# Patient Record
Sex: MALE | Race: WHITE | Employment: FULL TIME | URBAN - METROPOLITAN AREA
[De-identification: names, ages, dates, MRNs, and addresses within clinical notes are randomized per-mention and may not be internally consistent; named-entity substitution may affect disease eponyms.]

---

## 2020-11-09 ENCOUNTER — APPOINTMENT (OUTPATIENT)
Dept: GENERAL RADIOLOGY | Age: 45
End: 2020-11-09
Payer: COMMERCIAL

## 2020-11-09 ENCOUNTER — HOSPITAL ENCOUNTER (EMERGENCY)
Age: 45
Discharge: HOME | End: 2020-11-09
Attending: EMERGENCY MEDICINE
Payer: COMMERCIAL

## 2020-11-09 VITALS
DIASTOLIC BLOOD PRESSURE: 104 MMHG | HEART RATE: 77 BPM | SYSTOLIC BLOOD PRESSURE: 151 MMHG | WEIGHT: 200 LBS | TEMPERATURE: 97.5 F | RESPIRATION RATE: 16 BRPM | BODY MASS INDEX: 28 KG/M2 | OXYGEN SATURATION: 95 % | HEIGHT: 71 IN

## 2020-11-09 PROCEDURE — 99285 EMERGENCY DEPT VISIT HI MDM: CPT

## 2020-11-09 PROCEDURE — 27818 TREATMENT OF ANKLE FRACTURE: CPT

## 2020-11-09 PROCEDURE — 6370000000 HC RX 637 (ALT 250 FOR IP): Performed by: PHYSICIAN ASSISTANT

## 2020-11-09 PROCEDURE — 96374 THER/PROPH/DIAG INJ IV PUSH: CPT

## 2020-11-09 PROCEDURE — 73610 X-RAY EXAM OF ANKLE: CPT

## 2020-11-09 PROCEDURE — 94770 HC ETCO2 MONITOR DAILY: CPT

## 2020-11-09 PROCEDURE — 96375 TX/PRO/DX INJ NEW DRUG ADDON: CPT

## 2020-11-09 PROCEDURE — 94761 N-INVAS EAR/PLS OXIMETRY MLT: CPT

## 2020-11-09 PROCEDURE — 2500000003 HC RX 250 WO HCPCS: Performed by: PHYSICIAN ASSISTANT

## 2020-11-09 PROCEDURE — 73600 X-RAY EXAM OF ANKLE: CPT

## 2020-11-09 PROCEDURE — 6360000002 HC RX W HCPCS: Performed by: PHYSICIAN ASSISTANT

## 2020-11-09 PROCEDURE — 2700000000 HC OXYGEN THERAPY PER DAY

## 2020-11-09 PROCEDURE — 6360000002 HC RX W HCPCS: Performed by: EMERGENCY MEDICINE

## 2020-11-09 RX ORDER — HYDROCODONE BITARTRATE AND ACETAMINOPHEN 5; 325 MG/1; MG/1
1 TABLET ORAL EVERY 6 HOURS PRN
Qty: 12 TABLET | Refills: 0 | Status: SHIPPED | OUTPATIENT
Start: 2020-11-09 | End: 2020-11-12

## 2020-11-09 RX ORDER — HYDROCODONE BITARTRATE AND ACETAMINOPHEN 5; 325 MG/1; MG/1
1 TABLET ORAL ONCE
Status: COMPLETED | OUTPATIENT
Start: 2020-11-09 | End: 2020-11-09

## 2020-11-09 RX ORDER — KETAMINE HYDROCHLORIDE 100 MG/ML
1 INJECTION, SOLUTION INTRAMUSCULAR; INTRAVENOUS ONCE
Status: COMPLETED | OUTPATIENT
Start: 2020-11-09 | End: 2020-11-09

## 2020-11-09 RX ORDER — PROPOFOL 10 MG/ML
1 INJECTION, EMULSION INTRAVENOUS ONCE
Status: COMPLETED | OUTPATIENT
Start: 2020-11-09 | End: 2020-11-09

## 2020-11-09 RX ORDER — IBUPROFEN 400 MG/1
400 TABLET ORAL EVERY 6 HOURS PRN
Qty: 20 TABLET | Refills: 0 | Status: SHIPPED | OUTPATIENT
Start: 2020-11-09

## 2020-11-09 RX ADMIN — KETAMINE HYDROCHLORIDE 90 MG: 100 INJECTION, SOLUTION, CONCENTRATE INTRAMUSCULAR; INTRAVENOUS at 18:13

## 2020-11-09 RX ADMIN — HYDROCODONE BITARTRATE AND ACETAMINOPHEN 1 TABLET: 5; 325 TABLET ORAL at 20:06

## 2020-11-09 RX ADMIN — PROPOFOL 91 MG: 10 INJECTION, EMULSION INTRAVENOUS at 18:11

## 2020-11-09 RX ADMIN — HYDROMORPHONE HYDROCHLORIDE 1 MG: 1 INJECTION, SOLUTION INTRAMUSCULAR; INTRAVENOUS; SUBCUTANEOUS at 20:15

## 2020-11-09 RX ADMIN — HYDROMORPHONE HYDROCHLORIDE 1 MG: 1 INJECTION, SOLUTION INTRAMUSCULAR; INTRAVENOUS; SUBCUTANEOUS at 17:41

## 2020-11-09 ASSESSMENT — PAIN SCALES - GENERAL
PAINLEVEL_OUTOF10: 5
PAINLEVEL_OUTOF10: 4
PAINLEVEL_OUTOF10: 10
PAINLEVEL_OUTOF10: 10
PAINLEVEL_OUTOF10: 6
PAINLEVEL_OUTOF10: 5
PAINLEVEL_OUTOF10: 5

## 2020-11-09 ASSESSMENT — PAIN DESCRIPTION - ORIENTATION: ORIENTATION: RIGHT

## 2020-11-09 ASSESSMENT — PAIN DESCRIPTION - LOCATION: LOCATION: ANKLE

## 2020-11-09 NOTE — ED NOTES
Reduction complete. Tech applying splint to right leg.  Distal pulses present     Joelle Blankenship RN  11/09/20 1499

## 2020-11-10 ASSESSMENT — ENCOUNTER SYMPTOMS
EYE PAIN: 0
SORE THROAT: 0
ABDOMINAL PAIN: 0
SHORTNESS OF BREATH: 0
BACK PAIN: 0
VOMITING: 0
NAUSEA: 0
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
## 2020-11-10 NOTE — ED NOTES
Pt instructed on crutch use and demonstrated understanding.  Pt discharged in stable condition with family     Roe Rinaldi RN  11/09/20 8891

## 2020-11-10 NOTE — ED NOTES
Went into the room one to give pt crutches. Looked at split and noticed splint was not made right. Pull Rn, PA and MD  Side and discussed about splint. PA and MD agreed. Splint was cut off and remade. Padding was applied, along with 5 cm of short leg, with 8 cm of a sugar tong. 2 X 4INCH ace wrap. Pt tolerated well. PA and RN in room at time.       Angel Sanchez  11/09/20 2032

## 2020-11-12 ENCOUNTER — OFFICE VISIT (OUTPATIENT)
Dept: PRIMARY CARE CLINIC | Age: 45
End: 2020-11-12

## 2020-11-12 ENCOUNTER — ANESTHESIA EVENT (OUTPATIENT)
Dept: OPERATING ROOM | Age: 45
End: 2020-11-12
Payer: COMMERCIAL

## 2020-11-12 ENCOUNTER — OFFICE VISIT (OUTPATIENT)
Dept: ORTHOPEDIC SURGERY | Age: 45
End: 2020-11-12
Payer: COMMERCIAL

## 2020-11-12 ENCOUNTER — TELEPHONE (OUTPATIENT)
Dept: ORTHOPEDIC SURGERY | Age: 45
End: 2020-11-12

## 2020-11-12 VITALS — HEIGHT: 71 IN | WEIGHT: 200 LBS | BODY MASS INDEX: 28 KG/M2

## 2020-11-12 PROCEDURE — 99999 PR OFFICE/OUTPT VISIT,PROCEDURE ONLY: CPT | Performed by: NURSE PRACTITIONER

## 2020-11-12 PROCEDURE — 99242 OFF/OP CONSLTJ NEW/EST SF 20: CPT | Performed by: ORTHOPAEDIC SURGERY

## 2020-11-12 RX ORDER — PHENTERMINE HYDROCHLORIDE 37.5 MG/1
37.5 CAPSULE ORAL EVERY MORNING
COMMUNITY

## 2020-11-12 NOTE — PROGRESS NOTES
Obstructive Sleep Apnea (USHA) Screening     Patient:  Diane Turk    YOB: 1975      Medical Record #:  [id number]                     Date:  11/12/2020     1. Are you a loud and/or regular snorer? []  Yes       [x] No    2. Have you been observed to gasp or stop breathing during sleep? []  Yes       [x] No    3. Do you feel tired or groggy upon awakening or do you awaken with a headache?           []  Yes       [x] No    4. Are you often tired or fatigued during the wake time hours? []  Yes       [x] No    5. Do you fall asleep sitting, reading, watching TV or driving? []  Yes       [x] No    6. Do you often have problems with memory or concentration? []  Yes       [x] No    **If patient's score is ? 3 they are considered high risk for USHA. An Anesthesia provider will evaluate the patient and develop a plan of care the day of surgery. Note:  If the patient's BMI is more than 35 kg m¯² , has neck circumference > 40 cm, and/or high blood pressure the risk is greater (© American Sleep Apnea Association, 2006).

## 2020-11-12 NOTE — LETTER
[facility]   Surgery Precert & Billing Form:    DEMOGRAPHICS:                                                                                                       Patient Name:  Nino Loyola  Patient :  1975   Patient SS#:      Patient Phone:  [phone] (home)  Alt. Patient Phone:    Patient Address:  [address] [address]    PCP:  Lamin Shafer MD  Insurance: Payor: [facility] / Plan: [facility] - [place] PPO / Product Type: *No Product type* /     DIAGNOSIS & PROCEDURE:                                                                                      Diagnosis:   1.  Closed trimalleolar fracture of right ankle, initial encounter      Diagnosis Code:  N25.162Q      Operation: right    SURGERY  INFORMATION  Date of Surgery:   20   Location:  [facility]    Type:    Outpatient  23 hour hold:  No  Surgeon:              Celine Jiménez MD      20     BILLING INFORMATION:                                                                                                Physician Procedure                                            CPT Codes        ORIF Right trimalleolar fracture  87118                      PA, or Fellow Procedure                                      CPT Codes                     Precert information:

## 2020-11-12 NOTE — LETTER
[address] [place] [address]              [] [facility]  Fax# [phone]                                                     [x] Vignesh TVY#[phone]                                      []Jeanne Fernandes3 [facility]#[phone]    Scheduled Date: 20      Scheduled Time: 1pm      Time Needed: 60min     Patient Information:      Name: Clarence Marroquin      YOB: 1975      SSN:         Gender:  male                            Address: [address] [place] [address]   Phone Number: [phone] (home)     Insurance:  Payor: [employer] / Plan: [employer] - [place] PPO / Product Type: *No Product type* /     Primary Care Physician:  Rossi Holman MD    H&P To Be Completed By: Dr. Petty Gip Needed? [] Yes [x] No   Pacemaker/Cardiac Implant? [] Yes [x] No    Surgical Procedure: ORIF Right trimalleolar ankle fracture   CPT AAYK:27479    Pre- Op Diagnosis:   1. Closed trimalleolar fracture of right ankle, initial encounter      Diagnosis Code: A99.523S    Rep: MyScreen      Notified: [x] Yes [] No    Special Equipment: Synthes medial malleolar plate, Supine position      [x] Mini C-ARM   [] Large  C-ARM     Patient Status:   [x] Outpatient         [] Same Day Admission      [] In- Patient          []Day Admit    Anesthesia Type:    [x] General         [] MAC       [] IV Regional          [] Local          [x] Popliteal   Allergies:  No Known Allergies  Latex: [] Yes [x] No     Vitals:    20 1108   Weight: 200 lb (90.7 kg)   Height: 5' 11\" (1.803 m)                                                                  PRE-SURGICAL PHYSICIAN ORDERS    Patient Name Clarence Marroquin     1975       Allergies Patient has no known allergies.             Pre-surgery Testing Orders:   [] Pre-surgical Anesthesia Orders Per Anesthesiologist    Preop Antibiotic Prophylaxis / DAY OF SURGERY

## 2020-11-12 NOTE — PATIENT INSTRUCTIONS

## 2020-11-13 ENCOUNTER — ANESTHESIA (OUTPATIENT)
Dept: OPERATING ROOM | Age: 45
End: 2020-11-13
Payer: COMMERCIAL

## 2020-11-13 ENCOUNTER — HOSPITAL ENCOUNTER (OUTPATIENT)
Age: 45
Setting detail: OUTPATIENT SURGERY
Discharge: HOME | End: 2020-11-13
Attending: ORTHOPAEDIC SURGERY | Admitting: ORTHOPAEDIC SURGERY
Payer: COMMERCIAL

## 2020-11-13 VITALS
WEIGHT: 200 LBS | HEART RATE: 102 BPM | SYSTOLIC BLOOD PRESSURE: 136 MMHG | TEMPERATURE: 97.4 F | RESPIRATION RATE: 16 BRPM | BODY MASS INDEX: 28 KG/M2 | HEIGHT: 71 IN | DIASTOLIC BLOOD PRESSURE: 98 MMHG | OXYGEN SATURATION: 93 %

## 2020-11-13 VITALS
RESPIRATION RATE: 5 BRPM | SYSTOLIC BLOOD PRESSURE: 110 MMHG | TEMPERATURE: 98.2 F | OXYGEN SATURATION: 98 % | DIASTOLIC BLOOD PRESSURE: 58 MMHG

## 2020-11-13 LAB — SARS-COV-2, PCR: NOT DETECTED

## 2020-11-13 PROCEDURE — 6360000002 HC RX W HCPCS: Performed by: ANESTHESIOLOGY

## 2020-11-13 PROCEDURE — 7100000000 HC PACU RECOVERY - FIRST 15 MIN: Performed by: ORTHOPAEDIC SURGERY

## 2020-11-13 PROCEDURE — 7100000001 HC PACU RECOVERY - ADDTL 15 MIN: Performed by: ORTHOPAEDIC SURGERY

## 2020-11-13 PROCEDURE — 6370000000 HC RX 637 (ALT 250 FOR IP): Performed by: ORTHOPAEDIC SURGERY

## 2020-11-13 PROCEDURE — C1713 ANCHOR/SCREW BN/BN,TIS/BN: HCPCS | Performed by: ORTHOPAEDIC SURGERY

## 2020-11-13 PROCEDURE — 6360000002 HC RX W HCPCS: Performed by: ORTHOPAEDIC SURGERY

## 2020-11-13 PROCEDURE — 2500000003 HC RX 250 WO HCPCS: Performed by: ANESTHESIOLOGY

## 2020-11-13 PROCEDURE — 2500000003 HC RX 250 WO HCPCS: Performed by: NURSE ANESTHETIST, CERTIFIED REGISTERED

## 2020-11-13 PROCEDURE — C1769 GUIDE WIRE: HCPCS | Performed by: ORTHOPAEDIC SURGERY

## 2020-11-13 PROCEDURE — 3700000001 HC ADD 15 MINUTES (ANESTHESIA): Performed by: ORTHOPAEDIC SURGERY

## 2020-11-13 PROCEDURE — 7100000011 HC PHASE II RECOVERY - ADDTL 15 MIN: Performed by: ORTHOPAEDIC SURGERY

## 2020-11-13 PROCEDURE — 6360000002 HC RX W HCPCS: Performed by: NURSE ANESTHETIST, CERTIFIED REGISTERED

## 2020-11-13 PROCEDURE — 2709999900 HC NON-CHARGEABLE SUPPLY: Performed by: ORTHOPAEDIC SURGERY

## 2020-11-13 PROCEDURE — 2580000003 HC RX 258: Performed by: ANESTHESIOLOGY

## 2020-11-13 PROCEDURE — 64447 NJX AA&/STRD FEMORAL NRV IMG: CPT | Performed by: ANESTHESIOLOGY

## 2020-11-13 PROCEDURE — 3600000015 HC SURGERY LEVEL 5 ADDTL 15MIN: Performed by: ORTHOPAEDIC SURGERY

## 2020-11-13 PROCEDURE — 7100000010 HC PHASE II RECOVERY - FIRST 15 MIN: Performed by: ORTHOPAEDIC SURGERY

## 2020-11-13 PROCEDURE — 3700000000 HC ANESTHESIA ATTENDED CARE: Performed by: ORTHOPAEDIC SURGERY

## 2020-11-13 PROCEDURE — 3600000005 HC SURGERY LEVEL 5 BASE: Performed by: ORTHOPAEDIC SURGERY

## 2020-11-13 PROCEDURE — 64445 NJX AA&/STRD SCIATIC NRV IMG: CPT | Performed by: ANESTHESIOLOGY

## 2020-11-13 PROCEDURE — 2580000003 HC RX 258: Performed by: ORTHOPAEDIC SURGERY

## 2020-11-13 PROCEDURE — 2720000010 HC SURG SUPPLY STERILE: Performed by: ORTHOPAEDIC SURGERY

## 2020-11-13 DEVICE — PLATE BNE L99MM 5 H R DST LAT FIBULAR S STL LOK COMPR FOR: Type: IMPLANTABLE DEVICE | Site: ANKLE | Status: FUNCTIONAL

## 2020-11-13 DEVICE — SCREW BNE L36MM DIA4MM S STL CANN SHT 1/3 THRD SM HEX SOCK: Type: IMPLANTABLE DEVICE | Site: ANKLE | Status: FUNCTIONAL

## 2020-11-13 DEVICE — SCREW BNE L38MM DIA4MM S STL CANN SHT 1/3 THRD SM HEX SOCK: Type: IMPLANTABLE DEVICE | Site: ANKLE | Status: FUNCTIONAL

## 2020-11-13 DEVICE — SCREW BNE L12MM DIA2.7MM ANK S STL ST VAR ANG LOK FULL THRD: Type: IMPLANTABLE DEVICE | Site: ANKLE | Status: FUNCTIONAL

## 2020-11-13 DEVICE — SCREW BNE L14MM DIA2.7MM ANK S STL ST VAR ANG LOK FULL THRD: Type: IMPLANTABLE DEVICE | Site: ANKLE | Status: FUNCTIONAL

## 2020-11-13 DEVICE — SCREW BNE L14MM DIA3.5MM CORT S STL ST NONCANNULATED LOK: Type: IMPLANTABLE DEVICE | Site: ANKLE | Status: FUNCTIONAL

## 2020-11-13 DEVICE — SCREW BNE L16MM DIA2.7MM ANK S STL ST VAR ANG LOK FULL THRD: Type: IMPLANTABLE DEVICE | Site: ANKLE | Status: FUNCTIONAL

## 2020-11-13 DEVICE — SCREW BNE L20MM DIA2.7MM CORT S STL ST FULL THRD FOR SM: Type: IMPLANTABLE DEVICE | Site: ANKLE | Status: FUNCTIONAL

## 2020-11-13 RX ORDER — CELECOXIB 100 MG/1
400 CAPSULE ORAL
Status: COMPLETED | OUTPATIENT
Start: 2020-11-13 | End: 2020-11-13

## 2020-11-13 RX ORDER — LIDOCAINE HYDROCHLORIDE 20 MG/ML
INJECTION, SOLUTION INFILTRATION; PERINEURAL PRN
Status: DISCONTINUED | OUTPATIENT
Start: 2020-11-13 | End: 2020-11-13 | Stop reason: SDUPTHER

## 2020-11-13 RX ORDER — OXYCODONE HYDROCHLORIDE AND ACETAMINOPHEN 5; 325 MG/1; MG/1
1 TABLET ORAL EVERY 6 HOURS PRN
Qty: 28 TABLET | Refills: 0 | Status: SHIPPED | OUTPATIENT
Start: 2020-11-13 | End: 2020-11-19 | Stop reason: SDUPTHER

## 2020-11-13 RX ORDER — SODIUM CHLORIDE, SODIUM LACTATE, POTASSIUM CHLORIDE, CALCIUM CHLORIDE 600; 310; 30; 20 MG/100ML; MG/100ML; MG/100ML; MG/100ML
INJECTION, SOLUTION INTRAVENOUS CONTINUOUS
Status: DISCONTINUED | OUTPATIENT
Start: 2020-11-13 | End: 2020-11-13 | Stop reason: HOSPADM

## 2020-11-13 RX ORDER — SODIUM CHLORIDE 0.9 % (FLUSH) 0.9 %
10 SYRINGE (ML) INJECTION PRN
Status: DISCONTINUED | OUTPATIENT
Start: 2020-11-13 | End: 2020-11-13 | Stop reason: HOSPADM

## 2020-11-13 RX ORDER — MORPHINE SULFATE 2 MG/ML
1 INJECTION, SOLUTION INTRAMUSCULAR; INTRAVENOUS EVERY 5 MIN PRN
Status: DISCONTINUED | OUTPATIENT
Start: 2020-11-13 | End: 2020-11-13 | Stop reason: HOSPADM

## 2020-11-13 RX ORDER — MIDAZOLAM HYDROCHLORIDE 1 MG/ML
INJECTION INTRAMUSCULAR; INTRAVENOUS PRN
Status: DISCONTINUED | OUTPATIENT
Start: 2020-11-13 | End: 2020-11-13 | Stop reason: SDUPTHER

## 2020-11-13 RX ORDER — HYDROCODONE BITARTRATE AND ACETAMINOPHEN 5; 325 MG/1; MG/1
1 TABLET ORAL EVERY 6 HOURS PRN
Status: ON HOLD | COMMUNITY
End: 2020-11-13 | Stop reason: HOSPADM

## 2020-11-13 RX ORDER — MORPHINE SULFATE 2 MG/ML
2 INJECTION, SOLUTION INTRAMUSCULAR; INTRAVENOUS EVERY 5 MIN PRN
Status: DISCONTINUED | OUTPATIENT
Start: 2020-11-13 | End: 2020-11-13 | Stop reason: HOSPADM

## 2020-11-13 RX ORDER — ACETAMINOPHEN 500 MG
1000 TABLET ORAL
Status: COMPLETED | OUTPATIENT
Start: 2020-11-13 | End: 2020-11-13

## 2020-11-13 RX ORDER — OXYCODONE HYDROCHLORIDE AND ACETAMINOPHEN 5; 325 MG/1; MG/1
2 TABLET ORAL PRN
Status: DISCONTINUED | OUTPATIENT
Start: 2020-11-13 | End: 2020-11-13 | Stop reason: HOSPADM

## 2020-11-13 RX ORDER — LABETALOL HYDROCHLORIDE 5 MG/ML
5 INJECTION, SOLUTION INTRAVENOUS EVERY 10 MIN PRN
Status: DISCONTINUED | OUTPATIENT
Start: 2020-11-13 | End: 2020-11-13 | Stop reason: HOSPADM

## 2020-11-13 RX ORDER — OXYCODONE HYDROCHLORIDE AND ACETAMINOPHEN 5; 325 MG/1; MG/1
1 TABLET ORAL PRN
Status: DISCONTINUED | OUTPATIENT
Start: 2020-11-13 | End: 2020-11-13 | Stop reason: HOSPADM

## 2020-11-13 RX ORDER — BUPIVACAINE HYDROCHLORIDE 2.5 MG/ML
INJECTION, SOLUTION EPIDURAL; INFILTRATION; INTRACAUDAL
Status: COMPLETED | OUTPATIENT
Start: 2020-11-13 | End: 2020-11-13

## 2020-11-13 RX ORDER — ONDANSETRON 2 MG/ML
INJECTION INTRAMUSCULAR; INTRAVENOUS PRN
Status: DISCONTINUED | OUTPATIENT
Start: 2020-11-13 | End: 2020-11-13 | Stop reason: SDUPTHER

## 2020-11-13 RX ORDER — MAGNESIUM HYDROXIDE 1200 MG/15ML
LIQUID ORAL CONTINUOUS PRN
Status: COMPLETED | OUTPATIENT
Start: 2020-11-13 | End: 2020-11-13

## 2020-11-13 RX ORDER — SODIUM CHLORIDE 0.9 % (FLUSH) 0.9 %
10 SYRINGE (ML) INJECTION EVERY 12 HOURS SCHEDULED
Status: DISCONTINUED | OUTPATIENT
Start: 2020-11-13 | End: 2020-11-13 | Stop reason: HOSPADM

## 2020-11-13 RX ORDER — PROMETHAZINE HYDROCHLORIDE 25 MG/ML
6.25 INJECTION, SOLUTION INTRAMUSCULAR; INTRAVENOUS
Status: DISCONTINUED | OUTPATIENT
Start: 2020-11-13 | End: 2020-11-13 | Stop reason: HOSPADM

## 2020-11-13 RX ORDER — DIPHENHYDRAMINE HYDROCHLORIDE 50 MG/ML
12.5 INJECTION INTRAMUSCULAR; INTRAVENOUS
Status: DISCONTINUED | OUTPATIENT
Start: 2020-11-13 | End: 2020-11-13 | Stop reason: HOSPADM

## 2020-11-13 RX ORDER — HYDRALAZINE HYDROCHLORIDE 20 MG/ML
5 INJECTION INTRAMUSCULAR; INTRAVENOUS EVERY 10 MIN PRN
Status: DISCONTINUED | OUTPATIENT
Start: 2020-11-13 | End: 2020-11-13 | Stop reason: HOSPADM

## 2020-11-13 RX ORDER — PROPOFOL 10 MG/ML
INJECTION, EMULSION INTRAVENOUS PRN
Status: DISCONTINUED | OUTPATIENT
Start: 2020-11-13 | End: 2020-11-13 | Stop reason: SDUPTHER

## 2020-11-13 RX ORDER — MEPERIDINE HYDROCHLORIDE 50 MG/ML
12.5 INJECTION INTRAMUSCULAR; INTRAVENOUS; SUBCUTANEOUS EVERY 5 MIN PRN
Status: DISCONTINUED | OUTPATIENT
Start: 2020-11-13 | End: 2020-11-13 | Stop reason: HOSPADM

## 2020-11-13 RX ORDER — DEXAMETHASONE SODIUM PHOSPHATE 10 MG/ML
INJECTION INTRAMUSCULAR; INTRAVENOUS PRN
Status: DISCONTINUED | OUTPATIENT
Start: 2020-11-13 | End: 2020-11-13 | Stop reason: SDUPTHER

## 2020-11-13 RX ORDER — ONDANSETRON 2 MG/ML
4 INJECTION INTRAMUSCULAR; INTRAVENOUS PRN
Status: DISCONTINUED | OUTPATIENT
Start: 2020-11-13 | End: 2020-11-13 | Stop reason: HOSPADM

## 2020-11-13 RX ADMIN — ACETAMINOPHEN 1000 MG: 500 TABLET ORAL at 12:11

## 2020-11-13 RX ADMIN — SODIUM CHLORIDE, SODIUM LACTATE, POTASSIUM CHLORIDE, AND CALCIUM CHLORIDE: .6; .31; .03; .02 INJECTION, SOLUTION INTRAVENOUS at 13:17

## 2020-11-13 RX ADMIN — FAMOTIDINE 20 MG: 10 INJECTION, SOLUTION INTRAVENOUS at 12:11

## 2020-11-13 RX ADMIN — CEFAZOLIN SODIUM 2 G: 10 INJECTION, POWDER, FOR SOLUTION INTRAVENOUS at 13:17

## 2020-11-13 RX ADMIN — BUPIVACAINE HYDROCHLORIDE 20 ML: 2.5 INJECTION, SOLUTION EPIDURAL; INFILTRATION; INTRACAUDAL; PERINEURAL at 13:06

## 2020-11-13 RX ADMIN — CELECOXIB 400 MG: 100 CAPSULE ORAL at 12:11

## 2020-11-13 RX ADMIN — SODIUM CHLORIDE, SODIUM LACTATE, POTASSIUM CHLORIDE, AND CALCIUM CHLORIDE: .6; .31; .03; .02 INJECTION, SOLUTION INTRAVENOUS at 14:50

## 2020-11-13 RX ADMIN — DEXAMETHASONE SODIUM PHOSPHATE 10 MG: 10 INJECTION INTRAMUSCULAR; INTRAVENOUS at 13:28

## 2020-11-13 RX ADMIN — LIDOCAINE HYDROCHLORIDE 80 MG: 20 INJECTION, SOLUTION INFILTRATION; PERINEURAL at 13:20

## 2020-11-13 RX ADMIN — MIDAZOLAM HYDROCHLORIDE 4 MG: 2 INJECTION, SOLUTION INTRAMUSCULAR; INTRAVENOUS at 12:48

## 2020-11-13 RX ADMIN — PROPOFOL 200 MG: 10 INJECTION, EMULSION INTRAVENOUS at 13:20

## 2020-11-13 RX ADMIN — ONDANSETRON 4 MG: 2 INJECTION INTRAMUSCULAR; INTRAVENOUS at 13:28

## 2020-11-13 RX ADMIN — ONDANSETRON 4 MG: 2 INJECTION INTRAMUSCULAR; INTRAVENOUS at 15:43

## 2020-11-13 ASSESSMENT — PULMONARY FUNCTION TESTS
PIF_VALUE: 2
PIF_VALUE: 11
PIF_VALUE: 2
PIF_VALUE: 5
PIF_VALUE: 2
PIF_VALUE: 11
PIF_VALUE: 2
PIF_VALUE: 11
PIF_VALUE: 2
PIF_VALUE: 11
PIF_VALUE: 5
PIF_VALUE: 11
PIF_VALUE: 11
PIF_VALUE: 3
PIF_VALUE: 11
PIF_VALUE: 2
PIF_VALUE: 3
PIF_VALUE: 2
PIF_VALUE: 11
PIF_VALUE: 1
PIF_VALUE: 2
PIF_VALUE: 2
PIF_VALUE: 11
PIF_VALUE: 2
PIF_VALUE: 2
PIF_VALUE: 3
PIF_VALUE: 2
PIF_VALUE: 2
PIF_VALUE: 12
PIF_VALUE: 10
PIF_VALUE: 10
PIF_VALUE: 11
PIF_VALUE: 2
PIF_VALUE: 11
PIF_VALUE: 11
PIF_VALUE: 2
PIF_VALUE: 11
PIF_VALUE: 3
PIF_VALUE: 12
PIF_VALUE: 1
PIF_VALUE: 11
PIF_VALUE: 2
PIF_VALUE: 2
PIF_VALUE: 11
PIF_VALUE: 10
PIF_VALUE: 11
PIF_VALUE: 11
PIF_VALUE: 1
PIF_VALUE: 11
PIF_VALUE: 2
PIF_VALUE: 11
PIF_VALUE: 2
PIF_VALUE: 11
PIF_VALUE: 2
PIF_VALUE: 11
PIF_VALUE: 2
PIF_VALUE: 11
PIF_VALUE: 2
PIF_VALUE: 2
PIF_VALUE: 11
PIF_VALUE: 5
PIF_VALUE: 2
PIF_VALUE: 2
PIF_VALUE: 11
PIF_VALUE: 5
PIF_VALUE: 1
PIF_VALUE: 2
PIF_VALUE: 3
PIF_VALUE: 1
PIF_VALUE: 5
PIF_VALUE: 2
PIF_VALUE: 4
PIF_VALUE: 2
PIF_VALUE: 11
PIF_VALUE: 10
PIF_VALUE: 11
PIF_VALUE: 3
PIF_VALUE: 11
PIF_VALUE: 11
PIF_VALUE: 2
PIF_VALUE: 3

## 2020-11-13 ASSESSMENT — PAIN SCALES - GENERAL: PAINLEVEL_OUTOF10: 2

## 2020-11-13 ASSESSMENT — PAIN - FUNCTIONAL ASSESSMENT: PAIN_FUNCTIONAL_ASSESSMENT: 0-10

## 2020-11-13 NOTE — OP NOTE
Operative Note      Patient: Danie Rogers  YOB: 1975  MRN: [id number]    Date of Procedure: 11/13/2020    Pre-Op Diagnosis: CLOSED TRIMALLEOLAR FRACTURE RIGHT ANKLE    Post-Op Diagnosis: Same       Procedure(s):  OPEN REDUCTION INTERNAL FIXATION RIGHT TRIMALLEOLAR ANKLE FRACTURE WITH MINI C-ARM              SYNTHES    #1. Open reduction internal fixation right trimalleolar ankle fracture with fixation of the lateral malleolus and medial malleolus with closed treatment of the posterior malleolar lip    #2. Obtain and interpret intraoperative fluoroscopic images including 3 views of the right ankle; mortise, lateral, intraoperative stress views. Surgeon(s):  Kellen Palacios MD    Assistant:   Surgical Assistant: Anai Andrade    Anesthesia: General    Estimated Blood Loss (mL): less than 765     Complications: None    Specimens:   * No specimens in log *    Implants:  Implant Name Type Inv.  Item Serial No.  Lot No. LRB No. Used Action   PLATE BNE [id number] 5 H R DST LAT FIBULAR S STL PK COMPR FOR  PLATE BNE [id number] 5 H R DST LAT FIBULAR S STL PK COMPR FOR  DEPUY [employer]  Right 1 Implanted   SCREW BNE L20MM DIA2.7MM JOHANNE S STL ST FULL THRD FOR SM  SCREW BNE L20MM DIA2.7MM JOHANNE S STL ST FULL THRD FOR SM  DEPUY [employer]WD  Right 1 Implanted   SCREW BNE L12MM DIA2.7MM ANK S STL ST CELIO ANG PK FULL THRD  SCREW BNE L12MM DIA2.7MM ANK S STL ST CELIO ANG PK FULL THRD  DEPUY [employer]-WD  Right 1 Implanted   SCREW BNE L16MM DIA2.7MM ANK S STL ST CELIO ANG PK FULL THRD  SCREW BNE L16MM DIA2.7MM ANK S STL ST CELIO ANG PK FULL THRD  DEPUY [employer]-WD  Right 1 Implanted   SCREW BNE L14MM DIA3.5MM JOHANNE S STL ST NONCANNULATED PK  SCREW BNE L14MM DIA3.5MM JOHANNE S STL ST NONCANNULATED PK  DEPUY [employer]-WD  Right 3 Implanted   SCREW BNE L14MM DIA2.7MM ANK S STL ST CELIO ANG PK FULL THRD  SCREW BNE L14MM DIA2.7MM ANK S STL ST CELIO ANG PK FULL THRD  DEPUY [employer]-WD  Right 2 Implanted SCREW BNE L36MM DIA4MM S STL TARI SHT 1/3 THRD SM HEX SOCK  SCREW BNE L36MM DIA4MM S STL TARI SHT 1/3 THRD SM HEX SOCK  DEPUY [employer]-WD  Right 1 Implanted   SCREW BNE L38MM DIA4MM S STL TARI SHT 1/3 THRD SM HEX SOCK  SCREW BNE L38MM DIA4MM S STL TARI SHT 1/3 THRD SM HEX SOCK  DEPUY [employer]-WD  Right 1 Implanted         Drains: * No LDAs found *    Findings: Please see operative note    Detailed Description of Procedure: This patient is a 58-year-old gentleman with a high-energy trimalleolar ankle fracture. Seen in the emergency room underwent a closed reduction of a complete posterior lateral dislocation. He had been seen in the office with a careful discussion of the risks benefits and alternatives to surgery. Surgery indicated for the restoration of stability of the mortise reliable healing. Informed consent was discussed with the patient. This included a detailed description of the procedure. Risks, benefits and alternatives specific to this diagnosis and procedure were outlined. Standard surgical risks of anesthesia, bleeding, nerve damage, infection, need for further surgeries, disability and death also outlined. Verbal confirmation of informed consent was obtained. Signature obtained by the staff. The patient was identified marked. Informed consent was confirmed. He is comfortable suite. He is placed supine. After the induction of anesthesia he was carefully padded and positioned. Tourniquet was applied to the distal  thigh leg was slightly elevated. Meticulous sterile prep and drape. The leg was exsanguinated with an Esmarch bandage after performing a surgical timeout. A direct lateral approach was utilized to the fibula. Skin incised sharply. Meticulous hemostasis. Superficial peroneal nerve protected. Careful elevation including the periosteum. Fracture line was easily identified with a typical posterior oblique Garcia B pattern.   Gentle distraction allowed a near anatomic reduction. It was clamped with a fracture reduction clamp. An AO lag screw technique was placed with good purchase. We subsequently placed a neutralizing buttress fibular locking plate. 4 locking screws distally. 3 cortical screws proximally. At this point we utilized intraoperative fluoroscopic imaging to confirm a good reduction of the fibula and placement of the hardware. In addition, we performed an intraoperative stress test to confirm the stability of the syndesmosis. The medial malleolar fragment was extremely displaced. We tried to utilize a percutaneous technique but were unable to satisfactorily reduce the joint. As such, we made an open incision over the medial malleolus. I utilized a drill hole proximally and a reduction clamp distally to perform an anatomic and well compressed reduction. This was confirmed on fluoroscopy. Subsequently to 4.0 mm cannulated screws were utilized. The fracture was quite distal and as such we needed to be quite distal and vertical for good fixation. We are very pleased with the firm tightening of the screws. Final imaging was obtained. This included mortise views and lateral views. We completed the procedure with irrigation with a dilute iodine solution. Careful closure in layers including the deep fascia with 2-0 Vicryl, buried 3-0 Vicryl and subtenons layer and staples in the skin. We utilized Xeroform, 4 x 4's and a very soft well-padded splint was applied in a sterile fashion. Anesthesia reversed and stable recovery room.     Patient be discharged home            Electronically signed by Ilene Arteaga MD on 11/13/2020 at 5:11 PM

## 2020-11-13 NOTE — PROGRESS NOTES
Discharge instructions reviewed with patient and wife. Patient and wife verbalized understanding. All home medications have been reviewed, questions answered and patient voiced understanding. Given prescriptions, discharge instructions, and appointment times.

## 2020-11-13 NOTE — ANESTHESIA PROCEDURE NOTES
Peripheral Block    Patient location during procedure: PACU  Start time: 11/13/2020 12:56 PM  End time: 11/13/2020 1:01 PM  Staffing  Anesthesiologist: Davida Taylor MD  Preanesthetic Checklist  Completed: patient identified, site marked, surgical consent, pre-op evaluation, timeout performed, IV checked, risks and benefits discussed, monitors and equipment checked, anesthesia consent given, oxygen available and patient being monitored  Peripheral Block  Patient position: supine  Prep: ChloraPrep  Patient monitoring: cardiac monitor, continuous pulse ox, frequent blood pressure checks and IV access  Block type: Femoral  Laterality: right  Injection technique: single-shot  Procedures: ultrasound guided  Local infiltration: lidocaine  Infiltration strength: 1 %  Adductor canal  Provider prep: mask and sterile gloves  Local infiltration: lidocaine  Needle  Needle type: combined needle/nerve stimulator   Needle gauge: 22 G  Needle length: 5 cm  Needle localization: ultrasound guidance  Assessment  Injection assessment: negative aspiration for heme, no paresthesia on injection and local visualized surrounding nerve on ultrasound  Slow fractionated injection: yes  Hemodynamics: stable  Additional Notes  Sartorius and Vastus Medialis Muscle, Femoral artery and Saphenous nerve are identified; the tip of the needle and the spread of the local anesthetic around the Saphenous nerve are visualized. The Saphenous nerve appeared to be anatomically normal and there were no abnormal pathologically findings seen.  Epi in block  Medications Administered  Bupivacaine (MARCAINE) PF injection 0.25%, 20 mL  Reason for block: post-op pain management

## 2020-11-13 NOTE — PROGRESS NOTES
Received report from [facility], SHAY and Ayaz Reeves RN. VSS. Sp02 93% on r/a. Oral airway in place. Pt sleeping soundly. Ace wrap to rt ankle with dressing c,d, & I. Will continue to monitor.

## 2020-11-13 NOTE — ANESTHESIA PROCEDURE NOTES
Peripheral Block    Patient location during procedure: pre-op  Start time: 11/13/2020 12:51 PM  End time: 11/13/2020 12:56 PM  Staffing  Anesthesiologist: Brittni Rios MD  Performed: anesthesiologist   Preanesthetic Checklist  Completed: patient identified, site marked, surgical consent, pre-op evaluation, timeout performed, IV checked, risks and benefits discussed, monitors and equipment checked, anesthesia consent given, oxygen available and patient being monitored  Peripheral Block  Prep: ChloraPrep  Patient monitoring: cardiac monitor, continuous pulse ox, frequent blood pressure checks and IV access  Block type: Sciatic  Laterality: right  Injection technique: single-shot  Procedures: ultrasound guided  Local infiltration: lidocaine  Infiltration strength: 1 %  Dose: 3 mL  Popliteal  Provider prep: mask and sterile gloves  Local infiltration: lidocaine  Needle  Needle gauge: 21 G  Needle length: 10 cm  Needle localization: ultrasound guidance  Assessment  Injection assessment: negative aspiration for heme, no paresthesia on injection and local visualized surrounding nerve on ultrasound  Paresthesia pain: none  Slow fractionated injection: yes  Hemodynamics: stable  Additional Notes  Immediately prior to procedure a \"time out\" was called to verify the correct patient, allergies, laterality, procedure and equipment. Time out performed with RN; epi in block    Biceps Femoris muscle (long head), Vastus lateralis muscle, Sciatic nerve (Tibia and Common Peroneal Nerves) and Popliteal artery are identified; the tip of the needle and the spread of the local anesthetic around the Tibial and Common Peroneal Nerve are visualized. The Sciatic Nerve (Tibia and Common Peroneal Nerve) appeared to be anatomically normal and there were no abnormal pathologically findings seen.          Medications Administered  Bupivacaine (MARCAINE) PF injection 0.25%, 20 mL  Reason for block: post-op pain management and at surgeon's request

## 2020-11-13 NOTE — H&P
Department of Orthopedic Surgery  Attending   History and Physical        CHIEF COMPLAINT:  [facility]    Reason for Admission: As Above    History Obtained From:  patient    HISTORY OF PRESENT ILLNESS:      The patient is a 54 y.o. male  who presents with plans for elective ORIF        Past Medical History:        Diagnosis Date    Kidney stone     Kidney stones     Melanoma ([address] 2003    FOREHEAD     Past Surgical History:        Procedure Laterality Date    LITHOTRIPSY      SKIN BIOPSY           Medications Prior to Admission:   Prior to Admission medications    Medication Sig Start Date End Date Taking? Authorizing Provider   HYDROcodone-acetaminophen (NORCO) 5-325 MG per tablet Take 1 tablet by mouth every 6 hours as needed for Pain. Yes Historical Provider, MD   phentermine (ADIPEX-P) 37.5 MG capsule Take 37.5 mg by mouth every morning. Yes Historical Provider, MD   ibuprofen (ADVIL;MOTRIN) 400 MG tablet Take 1 tablet by mouth every 6 hours as needed for Pain 11/9/20  Yes ANA Gaemz   ondansetron (ZOFRAN ODT) 4 MG disintegrating tablet Take 1 tablet by mouth every 8 hours as needed for Nausea 7/16/16   ANA Smith       Allergies:  Patient has no known allergies. Social History:    Tobacco:  reports that he has never smoked. He has never used smokeless tobacco.   Alcohol:  reports current alcohol use.    Illicit Drug: No  Family History:       Problem Relation Age of Onset    Other Father 43        ACCIDENTAL DEATH     REVIEW OF SYSTEMS:  CONSTITUTIONAL:  negative  MUSCULOSKELETAL:  positive for  pain    PHYSICAL EXAM:  Admission weight: 200 lb (90.7 kg)  5' 10.5\" (179.1 cm)  VITALS:  BP (!) 147/98   Pulse 90   Temp 98.1 °F (36.7 °C) (Temporal)   Resp 18   Ht 5' 10.5\" (1.791 m)   Wt 200 lb (90.7 kg)   SpO2 98%   BMI 28.29 kg/m²     CONSTITUTIONAL:  awake, alert, cooperative, no apparent distress, and appears stated age  Cardiac RRR  Pulm CTA B  MUSCULOSKELETAL:  There is no redness, warmth, or swelling of the joints. Full range of motion noted. Motor strength is 5 out of 5 all extremities bilaterally.   Tone is normal.    DATA:  CBC:   Lab Results   Component Value Date    WBC 5.7 07/15/2016    RBC 5.02 07/15/2016    HGB 14.4 07/15/2016    HCT 42.9 07/15/2016    MCV 85.5 07/15/2016    MCH 28.7 07/15/2016    MCHC 33.5 07/15/2016    RDW 14.3 07/15/2016     07/15/2016    MPV 10.3 07/15/2016     BMP:    Lab Results   Component Value Date     07/15/2016    K 4.2 07/15/2016    CL 99 07/15/2016    CO2 26 07/15/2016    BUN 21 07/15/2016    LABALBU 4.6 07/15/2016    CREATININE 1.0 07/15/2016    CALCIUM 9.7 07/15/2016    GFRAA >60 07/15/2016    GFRAA >60 05/10/2010    LABGLOM >60 07/15/2016    GLUCOSE 118 07/15/2016     PT/INR:  No results found for: PROTIME, INR    Radiology:  No orders to display         ASSESSMENT: [facility]    PLAN:  1)  ORIF  2)  D/C  3)  Std periop care      Mari Varghese

## 2020-11-13 NOTE — ANESTHESIA PRE PROCEDURE
Department of Anesthesiology  Preprocedure Note       Name:  Lara Sosa   Age:  54 y.o.  :  1975                                          MRN:  [id number]         Date:  2020      Surgeon: Lise Powers):  Letha Hdez MD    Procedure: Procedure(s):  OPEN REDUCTION INTERNAL FIXATION RIGHT TRIMALLEOLAR ANKLE FRACTURE WITH MINI C-ARM              SYNTHES    Medications prior to admission:   Prior to Admission medications    Medication Sig Start Date End Date Taking? Authorizing Provider   HYDROcodone-acetaminophen (NORCO) 5-325 MG per tablet Take 1 tablet by mouth every 6 hours as needed for Pain. Yes Historical Provider, MD   phentermine (ADIPEX-P) 37.5 MG capsule Take 37.5 mg by mouth every morning.    Yes Historical Provider, MD   ibuprofen (ADVIL;MOTRIN) 400 MG tablet Take 1 tablet by mouth every 6 hours as needed for Pain 20  Yes ANA Patel   ondansetron (ZOFRAN ODT) 4 MG disintegrating tablet Take 1 tablet by mouth every 8 hours as needed for Nausea 16   ANA Diaz       Current medications:    Current Facility-Administered Medications   Medication Dose Route Frequency Provider Last Rate Last Dose    ceFAZolin (ANCEF) 2 g in dextrose 5 % 100 mL IVPB  2 g Intravenous On Call to Frank Ortega MD        celecoxib (CELEBREX) capsule 400 mg  400 mg Oral On Call to Frank Ortega MD        acetaminophen (TYLENOL) tablet 1,000 mg  1,000 mg Oral On Call to Frank Ortega MD        lactated ringers infusion   Intravenous Continuous Shanice Mcgrath MD        sodium chloride flush 0.9 % injection 10 mL  10 mL Intravenous 2 times per day Shanice Mcgrath MD        sodium chloride flush 0.9 % injection 10 mL  10 mL Intravenous PRN Shanice Mcgrath MD        famotidine (PEPCID) injection 20 mg  20 mg Intravenous Once Shanice Mcgrath MD           Allergies:  No Known Allergies    Problem List:  There is no problem list on file for this patient. Past Medical History:        Diagnosis Date    Kidney stone     Kidney stones     Melanoma ([address] 2003    FOREHEAD       Past Surgical History:        Procedure Laterality Date    LITHOTRIPSY      SKIN BIOPSY         Social History:    Social History     Tobacco Use    Smoking status: Never Smoker    Smokeless tobacco: Never Used   Substance Use Topics    Alcohol use: Yes     Comment: occassionally                                Counseling given: Not Answered      Vital Signs (Current):   Vitals:    11/12/20 1258 11/13/20 1131   BP:  (!) 147/98   Pulse:  81   Resp:  18   Temp:  98.1 °F (36.7 °C)   TempSrc:  Temporal   SpO2:  98%   Weight: 200 lb (90.7 kg) 200 lb (90.7 kg)   Height: 5' 10.5\" (1.791 m) 5' 10.5\" (1.791 m)                                              BP Readings from Last 3 Encounters:   11/13/20 (!) 147/98   11/09/20 (!) 151/104   07/16/16 126/81       NPO Status: Time of last liquid consumption: 2200                        Time of last solid consumption: 2200                        Date of last liquid consumption: 11/12/20                        Date of last solid food consumption: 11/12/20    BMI:   Wt Readings from Last 3 Encounters:   11/13/20 200 lb (90.7 kg)   11/12/20 200 lb (90.7 kg)   11/09/20 200 lb (90.7 kg)     Body mass index is 28.29 kg/m².     CBC:   Lab Results   Component Value Date    WBC 5.7 07/15/2016    RBC 5.02 07/15/2016    HGB 14.4 07/15/2016    HCT 42.9 07/15/2016    MCV 85.5 07/15/2016    RDW 14.3 07/15/2016     07/15/2016       CMP:   Lab Results   Component Value Date     07/15/2016    K 4.2 07/15/2016    CL 99 07/15/2016    CO2 26 07/15/2016    BUN 21 07/15/2016    CREATININE 1.0 07/15/2016    GFRAA >60 07/15/2016    GFRAA >60 05/10/2010    AGRATIO 1.6 07/15/2016    LABGLOM >60 07/15/2016    GLUCOSE 118 07/15/2016    PROT 7.5 07/15/2016    PROT 6.8 05/10/2010    CALCIUM 9.7 07/15/2016    BILITOT 0.6 07/15/2016    ALKPHOS 60 07/15/2016    AST 27 07/15/2016    ALT 18 07/15/2016       POC Tests: No results for input(s): POCGLU, POCNA, POCK, POCCL, POCBUN, POCHEMO, POCHCT in the last 72 hours. Coags: No results found for: PROTIME, INR, APTT    HCG (If Applicable): No results found for: PREGTESTUR, PREGSERUM, HCG, HCGQUANT     ABGs: No results found for: PHART, PO2ART, NEU3MDH, HIK9IHV, BEART, X0PFCTCL     Type & Screen (If Applicable):  No results found for: LABABO, LABRH    Drug/Infectious Status (If Applicable):  No results found for: HIV, HEPCAB    COVID-19 Screening (If Applicable):   Lab Results   Component Value Date    COVID19 Not Detected 11/12/2020         Anesthesia Evaluation  Patient summary reviewed and Nursing notes reviewed  Airway: Mallampati: II  TM distance: >3 FB   Neck ROM: full  Mouth opening: > = 3 FB Dental: normal exam         Pulmonary:Negative Pulmonary ROS and normal exam  breath sounds clear to auscultation                             Cardiovascular:Negative CV ROS            Rhythm: regular  Rate: normal                    Neuro/Psych:   Negative Neuro/Psych ROS              GI/Hepatic/Renal: Neg GI/Hepatic/Renal ROS  (+) renal disease: kidney stones,           Endo/Other:    (+) malignancy/cancer. Abdominal:           Vascular: negative vascular ROS. Anesthesia Plan      general     ASA 2       Induction: intravenous. MIPS: Postoperative opioids intended and Prophylactic antiemetics administered. Anesthetic plan and risks discussed with patient. Plan discussed with CRNA.                   Livier Crum MD   11/13/2020

## 2020-11-13 NOTE — BRIEF OP NOTE
Brief Postoperative Note      Patient: Mari Sandoval  YOB: 1975  MRN: [id number]    Date of Procedure: 11/13/2020    Pre-Op Diagnosis: CLOSED TRIMALLEOLAR FRACTURE RIGHT ANKLE    Post-Op Diagnosis: Same       Procedure(s):  OPEN REDUCTION INTERNAL FIXATION RIGHT TRIMALLEOLAR ANKLE FRACTURE WITH MINI C-ARM              SYNTHES    Surgeon(s):  Kareem Ayoub MD    Assistant:  Surgical Assistant: Patience Sake    Anesthesia: General    Estimated Blood Loss (mL): less than 411     Complications: None    Specimens:   * No specimens in log *    Implants:  Implant Name Type Inv.  Item Serial No.  Lot No. LRB No. Used Action   PLATE BNE [id number] 5 H R DST LAT FIBULAR S STL PK COMPR FOR  PLATE BNE [id number] 5 H R DST LAT FIBULAR S STL PK COMPR FOR  DEPU[employer]  Right 1 Implanted   SCREW BNE L20MM DIA2.7MM JOHANNE S STL ST FULL THRD FOR SM  SCREW BNE L20MM DIA2.7MM JOHANNE S STL ST FULL THRD FOR SM  DEPU[employer]WD  Right 1 Implanted   SCREW BNE L12MM DIA2.7MM ANK S STL ST CELIO ANG PK FULL THRD  SCREW BNE L12MM DIA2.7MM ANK S STL ST CELIO ANG PK FULL THRD  DEPUY [employer]WD  Right 1 Implanted   SCREW BNE L16MM DIA2.7MM ANK S STL ST CELIO ANG PK FULL THRD  SCREW BNE L16MM DIA2.7MM ANK S STL ST CELIO ANG PK FULL THRD  DEPUY [employer]-WD  Right 1 Implanted   SCREW BNE L14MM DIA3.5MM JOHANNE S STL ST NONCANNULATED PK  SCREW BNE L14MM DIA3.5MM JOHANNE S STL ST NONCANNULATED PK  DEPUY [employer]WD  Right 3 Implanted   SCREW BNE L14MM DIA2.7MM ANK S STL ST CELIO ANG PK FULL THRD  SCREW BNE L14MM DIA2.7MM ANK S STL ST CELIO ANG PK FULL THRD  DEPU[employer]-WD  Right 2 Implanted   SCREW BNE L36MM DIA4MM S STL TARI SHT 1/3 THRD SM HEX SOCK  SCREW BNE L36MM DIA4MM S STL TARI SHT 1/3 THRD SM HEX SOCK  DEPU[employer]WD  Right 1 Implanted   SCREW BNE L38MM DIA4MM S SARAH MARC SHT 1/3 THRD SM HEX SOCK  SCREW BNE L38MM DIA4MM S SARAH MARC T 1/3 THRD SM HEX SOCK  DEPU[employer]WD  Right 1 Implanted Drains: * No LDAs found *    Findings: please see Op note    Electronically signed by Shorty Diaz MD on 11/13/2020 at 5:11 PM

## 2020-11-15 NOTE — PROGRESS NOTES
education level: Not on file   Occupational History    Not on file   Social Needs    Financial resource strain: Not on file    Food insecurity     Worry: Not on file     Inability: Not on file    Transportation needs     Medical: Not on file     Non-medical: Not on file   Tobacco Use    Smoking status: Never Smoker    Smokeless tobacco: Never Used   Substance and Sexual Activity    Alcohol use: Yes     Comment: occassionally    Drug use: No    Sexual activity: Not on file   Lifestyle    Physical activity     Days per week: Not on file     Minutes per session: Not on file    Stress: Not on file   Relationships    Social connections     Talks on phone: Not on file     Gets together: Not on file     Attends Yazidi service: Not on file     Active member of club or organization: Not on file     Attends meetings of clubs or organizations: Not on file     Relationship status: Not on file    Intimate partner violence     Fear of current or ex partner: Not on file     Emotionally abused: Not on file     Physically abused: Not on file     Forced sexual activity: Not on file   Other Topics Concern    Not on file   Social History Narrative    Not on file       FAMILY HISTORY    Family History   Problem Relation Age of Onset    Other Father 43        ACCIDENTAL DEATH       PHYSICAL EXAM    Ht 5' 11\" (1.803 m)   Wt 200 lb (90.7 kg)   BMI 27.89 kg/m² [email or web address]   General:  Patient is alert and orientation to person place and time is age-appropriate. Gait:  Patient walks around the room and in the hallway with a normal gait and no limp. Balance and coordination are age-appropriate. Extremities:  LL splint, toes swollen pink and well perfused, non tende rprox fib, DNVI  Skin:  Normal on back and bilateral upper and lower extremities. Spine:  No deformity. Neurological:  Normal motor and sensory exam in both upper and lower extremities. Vascular exam:  Normal in both upper and lower extremities. IMAGING STUDIES    XRays from ER demonsytrate PL Fx/dislocation, displaced Garcia B fibula, small medial mall frag, small post lip. Post reducion        Office Procedures:      IMPRESSION     Displaced TriMalleolar Fx    PLAN    Rec ORIF for restoration mortise and relaible healing and future function    Plan OR Fri. Outpatient    Informed consent was discussed with the patient. This included a detailed description of the procedure. Risks, benefits and alternatives specific to this diagnosis and procedure were outlined. Standard surgical risks of anesthesia, bleeding, nerve damage, infection, need for further surgeries, disability and death also outlined. Verbal confirmation of informed consent was obtained. Signature obtained by the staff. [facility] and Sports Medicine Surgery    This dictation was performed with a verbal recognition program (DRAGON) and it was checked for errors. It is possible that there are still dictated errors within this office note. If so, please bring any errors to my attention for an addendum. All efforts were made to ensure that this office note is accurate.

## 2020-11-19 RX ORDER — OXYCODONE HYDROCHLORIDE AND ACETAMINOPHEN 5; 325 MG/1; MG/1
1 TABLET ORAL EVERY 6 HOURS PRN
Qty: 28 TABLET | Refills: 0 | Status: SHIPPED | OUTPATIENT
Start: 2020-11-20 | End: 2020-11-25 | Stop reason: SDUPTHER

## 2020-11-19 NOTE — TELEPHONE ENCOUNTER
Last office visit 11/12/2020     Last written 11/13/20     Surgery 11/13/20     Next office visit scheduled 11/23/2020    Requested Prescriptions     Pending Prescriptions Disp Refills    oxyCODONE-acetaminophen (PERCOCET) 5-325 MG per tablet 28 tablet 0     Sig: Take 1 tablet by mouth every 6 hours as needed for Pain for up to 7 days. Intended supply: 7 days.  Take lowest dose possible to manage pain

## 2020-11-23 ENCOUNTER — OFFICE VISIT (OUTPATIENT)
Dept: ORTHOPEDIC SURGERY | Age: 45
End: 2020-11-23
Payer: COMMERCIAL

## 2020-11-23 VITALS — BODY MASS INDEX: 28 KG/M2 | WEIGHT: 200 LBS | HEIGHT: 71 IN

## 2020-11-23 PROCEDURE — L4361 PNEUMA/VAC WALK BOOT PRE OTS: HCPCS | Performed by: ORTHOPAEDIC SURGERY

## 2020-11-23 PROCEDURE — 99024 POSTOP FOLLOW-UP VISIT: CPT | Performed by: ORTHOPAEDIC SURGERY

## 2020-11-23 NOTE — PROGRESS NOTES
Surgery: Open reduction internal fixation trimalleolar ankle fracture    Post-Op Week: 10 days    Chief Complaint:  Post-Op Check (RIGHT ANKLE)      History of Present of Illness: Mikayla Willis comes in today doing really quite well. No problems no complications. Pains been moderately bad particularly after his nerve block wore off. Seems to be better. He is rolling the scooter    Review of Systems  Pertinent items are noted in HPI  Denies fever, chills, confusion, bowel/bladder active change. Review of systems reviewed from Patient History Form dated on November 23 and available in the patient's chart under the Media tab. Examination:  On exam today he has moderate swelling. Intact neurovascular exam.  Tolerates very gentle tibiotalar movement. Brisk capillary refill. Negative Екатерина. Incisions are healing nicely. Radiology:     X-rays obtained reviewed the office today include 3 views of the right ankle. He has intact mortise. Anatomic alignment. No hardware complications. Orders Placed This Encounter   Procedures    XR ANKLE RIGHT (MIN 3 VIEWS)    Breg Tall Azucena Walking Boot     Patient was prescribed a Breg Tall Azucena Walking Boot. The right ankle will require stabilization / immobilization from this semi-rigid / rigid orthosis to improve their function. The orthosis will assist in protecting the affected area, provide functional support and facilitate healing. Patient was instructed to progress ambulation  as partial weight bearing in the device. The patient was educated and fit by a healthcare professional with expert knowledge and specialization in brace application while under the direct supervision of the physician. Verbal and written instructions for the use of and application of this item were provided. They were instructed to contact the office immediately should the brace result in increased pain, decreased sensation, increased swelling or worsening of the condition. Impression:  Progressing well after her internal fixation      Treatment Plan:  He will be touchdown weightbearing. He will use a boot. Gentle early tibiotalar dorsiflexion and plantarflexion. Desensitization scar massage. Elevation as possible. 4-week follow-up. [facility] of [facility] and Sports Medicine Surgery     This dictation was performed with a verbal recognition program (DRAGON) and it was checked for errors. It is possible that there are still dictated errors within this office note. If so, please bring any errors to my attention for an addendum. All efforts were made to ensure that this office note is accurate.

## 2020-11-24 ENCOUNTER — TELEPHONE (OUTPATIENT)
Dept: ORTHOPEDIC SURGERY | Age: 45
End: 2020-11-24

## 2020-11-25 ENCOUNTER — TELEPHONE (OUTPATIENT)
Dept: ORTHOPEDIC SURGERY | Age: 45
End: 2020-11-25

## 2020-11-25 RX ORDER — OXYCODONE HYDROCHLORIDE AND ACETAMINOPHEN 5; 325 MG/1; MG/1
1 TABLET ORAL EVERY 6 HOURS PRN
Qty: 28 TABLET | Refills: 0 | Status: SHIPPED | OUTPATIENT
Start: 2020-11-25 | End: 2020-12-04 | Stop reason: SDUPTHER

## 2020-11-25 NOTE — TELEPHONE ENCOUNTER
Last office visit 11/23/2020    Last written 11/20/20     Surgery 11/13/20     Next office visit scheduled 12/21/20    Requested Prescriptions     Pending Prescriptions Disp Refills    oxyCODONE-acetaminophen (PERCOCET) 5-325 MG per tablet 28 tablet 0     Sig: Take 1 tablet by mouth every 6 hours as needed for Pain for up to 7 days. Intended supply: 7 days.  Take lowest dose possible to manage pain

## 2020-11-25 NOTE — TELEPHONE ENCOUNTER
[place]  [facility] [place] REQUIRED. PER NOTES FOR THIS [employer].   AP

## 2020-12-04 RX ORDER — OXYCODONE HYDROCHLORIDE AND ACETAMINOPHEN 5; 325 MG/1; MG/1
1 TABLET ORAL EVERY 6 HOURS PRN
Qty: 28 TABLET | Refills: 0 | Status: SHIPPED | OUTPATIENT
Start: 2020-12-04 | End: 2020-12-11 | Stop reason: SDUPTHER

## 2020-12-10 NOTE — TELEPHONE ENCOUNTER
Last office visit 11/12/2020     Last written 12/4/20     Surgery 11/13/20 4 weeks 6 days     Next office visit [id number]/[id number]/20    Requested Prescriptions     Pending Prescriptions Disp Refills    oxyCODONE-acetaminophen (PERCOCET) 5-325 MG per tablet 28 tablet 0     Sig: Take 1 tablet by mouth every 6 hours as needed for Pain for up to 7 days. Intended supply: 7 days.  Take lowest dose possible to manage pain

## 2020-12-11 RX ORDER — OXYCODONE HYDROCHLORIDE AND ACETAMINOPHEN 5; 325 MG/1; MG/1
1 TABLET ORAL EVERY 6 HOURS PRN
Qty: 28 TABLET | Refills: 0 | Status: SHIPPED | OUTPATIENT
Start: 2020-12-11 | End: 2020-12-18

## 2020-12-21 ENCOUNTER — OFFICE VISIT (OUTPATIENT)
Dept: ORTHOPEDIC SURGERY | Age: 45
End: 2020-12-21

## 2020-12-21 VITALS — BODY MASS INDEX: 28.63 KG/M2 | HEIGHT: 70 IN | WEIGHT: 200 LBS

## 2020-12-21 PROCEDURE — 99024 POSTOP FOLLOW-UP VISIT: CPT | Performed by: PHYSICIAN ASSISTANT

## 2020-12-21 NOTE — PROGRESS NOTES
Surgery: Right ankle ORIF trimalleolar ankle fracture    Post-Op Week: 5.5    Chief Complaint:  Post-Op Check (Right ankle ORIF 11/13/2020)      History of Present of Illness: Axel Sen comes in today doing really quite well. He continues to use his rolling scooter. He is toe-touch weightbearing in a boot however he would like to level he has not been putting much weight on his foot at all. No problems no complications. Review of Systems  Pertinent items are noted in HPI  Denies fever, chills, confusion, bowel/bladder active change. Review of systems reviewed from Patient History Form dated on 11/12/2020 and available in the patient's chart under the Media tab. Examination:  On exam today he has mild swelling. Steri-Strips were removed today. Intact neurovascular exam.  Tolerates very gentle tibiotalar movement. Brisk capillary refill. Negative Екатерина. Incisions have healed nicely. Radiology:     X-rays obtained reviewed the office today include 3 views of the right ankle. He has intact mortise. Anatomic alignment. No hardware complications. Orders Placed This Encounter   Procedures    XR ANKLE RIGHT (MIN 3 VIEWS)    [facility]     Referral Priority:   Routine     Referral Type:   Eval and Treat     Referral Reason:   Specialty Services Required     Requested Specialty:   Physical Therapy     Number of Visits Requested:   1       Impression:  Right ankle status post ORIF      Treatment Plan: At this time, would like to progress his weightbearing to weightbearing as tolerated in his boot. Would like to get him started in physical therapy to start some ankle range of motion and ambulation assistance. Patient will follow-up with us in approximately 3 weeks so we can continue to progress his weightbearing and hopefully get him out of the boot at that point.               [facility] and [facility]  A Partner of [facility] ([facility])

## 2020-12-29 ENCOUNTER — HOSPITAL ENCOUNTER (OUTPATIENT)
Dept: PHYSICAL THERAPY | Age: 45
Setting detail: THERAPIES SERIES
Discharge: HOME | End: 2020-12-29
Payer: COMMERCIAL

## 2020-12-29 PROCEDURE — 97161 PT EVAL LOW COMPLEX 20 MIN: CPT

## 2020-12-29 PROCEDURE — 97110 THERAPEUTIC EXERCISES: CPT

## 2020-12-29 PROCEDURE — 97116 GAIT TRAINING THERAPY: CPT

## 2020-12-29 PROCEDURE — 97140 MANUAL THERAPY 1/> REGIONS: CPT

## 2020-12-29 NOTE — PLAN OF CARE
R ankle ORIF on 11/13/20. At his most recent appointment, he was cleared to begin weightbearing as tolerated in the boot. 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 [employer] Status/Prior Level of Function: Independent with ADLs and IADLs,     OBJECTIVE:     ROM LEFT RIGHT   HIP Flex     HIP Abd     HIP Ext     HIP IR     HIP ER     Knee ext     Knee Flex     Ankle PF 50 deg 45 deg   Ankle DF 10 deg 0 deg   Ankle In 30 deg 15 deg   Ankle Ev 16 deg 6 deg   Strength  LEFT RIGHT   HIP Flexors     HIP Abductors     HIP Ext     Hip ER     Knee EXT (quad)     Knee Flex (HS)     Ankle DF 5/5 NT   Ankle PF 5/5 NT   Ankle Inv 5/5 NT   Ankle EV 5/5 NT        Balance (up to 10 sec) LEFT RIGHT   Feet Together NT    Off Set Stance     Tandem Stance     Single Limb          Circumference             Reflexes/Sensation:    [x]Dermatomes/Myotomes intact    [x]Reflexes equal and normal bilaterally   []Other:    Joint mobility:    []Normal    [x]Hypo   []Hyper    Palpation: tenderness over medial incision, but no other superficial tenderness    Functional Mobility/Transfers: unable stand or perform RLEweightbearing movements    Posture: no weight through RLE    Bandages/Dressings/Incisions: incision healing well with no signs of infection    Gait: (include devices/WB status) using roller to avoid weightbearing on RLE    Orthopedic Special Tests: n/a                       [x] Patient history, allergies, meds reviewed. Medical chart reviewed. See intake form. Review Of Systems (ROS):  [x]Performed Review of systems (Integumentary, CardioPulmonary, Neurological) by intake and observation. Intake form has been scanned into medical record.  Patient has been instructed to contact their primary care physician regarding ROS issues if not already being addressed at this time. Co-morbidities/Complexities (which will affect course of rehabilitation):   []None           Arthritic conditions   []Rheumatoid arthritis (M05.9)  []Osteoarthritis (M19.91)   Cardiovascular conditions   []Hypertension (I10)  []Hyperlipidemia (E78.5)  []Angina pectoris (I20)  []Atherosclerosis (I70)   Musculoskeletal conditions   []Disc pathology   []Congenital spine pathologies   []Prior surgical intervention  []Osteoporosis (M81.8)  []Osteopenia (M85.8)   Endocrine conditions   []Hypothyroid (E03.9)  []Hyperthyroid Gastrointestinal conditions   []Constipation (V17.14)   Metabolic conditions   []Morbid obesity (E66.01)  []Diabetes type 1(E10.65) or 2 (E11.65)   []Neuropathy (G60.9)     Pulmonary conditions   []Asthma (J45)  []Coughing   []COPD (J44.9)   Psychological Disorders  []Anxiety (F41.9)  []Depression (F32.9)   []Other:   [x]Other:     Cancer      Barriers to/and or personal factors that will affect rehab potential:              []Age  []Sex              []Motivation/Lack of Motivation                        [x]Co-Morbidities              []Cognitive Function, education/learning barriers              []Environmental, home barriers              []profession/work barriers  []past PT/medical experience  []other:  Justification:     Falls Risk Assessment (30 days):   [x] Falls Risk assessed and no intervention required.   [] Falls Risk assessed and Patient requires intervention due to being higher risk   TUG score (>12s at risk):     [] Falls education provided, including       G-Codes:       ASSESSMENT:   Functional Impairments:     []Noted lumbar/proximal hip/LE joint hypomobility   []Decreased LE functional ROM   []Decreased core/proximal hip strength and neuromuscular control   []Decreased LE functional strength   []Reduced balance/proprioceptive control   []other:      Functional Activity Limitations (from functional impact the plan of care  [x]3 personal factors and/or comorbidities that impact the plan of care  [x] An examination of body systems using standardized tests and measures addressing any of the following: body structures and functions (impairments), activity limitations, and/or participation restrictions;:  [] a total of 1-2 or more elements   [] a total of 3 or more elements   [x] a total of 4 or more elements   [x] A clinical presentation with:  [x] stable and/or uncomplicated characteristics   [] evolving clinical presentation with changing characteristics  [] unstable and unpredictable characteristics;   [x] Clinical decision making of [] low, [] moderate, [] high complexity using standardized patient assessment instrument and/or measurable assessment of functional outcome. [x] EVAL (LOW) 49784 (typically 20 minutes face-to-face)  [] EVAL (MOD) 57322 (typically 30 minutes face-to-face)  [] EVAL (HIGH) 43640 (typically 45 minutes face-to-face)  [] RE-EVAL     PLAN  Frequency/Duration:  2 days per week for 12 weeks:  Interventions:  [x]  Therapeutic exercise including: strength training, ROM, for Lower extremity and core   [x]  NMR activation and proprioception for LE, Glutes and Core   [x]  Manual therapy as indicated for LE, Hip and spine to include: Dry Needling/IASTM, STM, PROM, Gr I-IV mobilizations, manipulation. [x] Modalities as needed that may include: thermal agents, E-stim, Biofeedback, US, iontophoresis as indicated  [x] Patient education on joint protection, postural re-education, activity modification, progression of HEP. HEP instruction: (see scanned forms)    GOALS:    Therapist goals for Patient:   Short Term Goals: To be achieved in: 2 weeks  1. Independent in HEP and progression per patient tolerance, in order to prevent re-injury. [] Progressing: [] Met: [] Not Met: [] Adjusted   2.  Patient will have a decrease in pain to facilitate improvement in movement, function, and ADLs as

## 2020-12-29 NOTE — FLOWSHEET NOTE
[facility], [address] [place] [address] [address], [id number]    Physical Therapy Treatment Note/ Progress Report:     Date:  2020    Patient Name:  Tiago Perirn    :  1975  MRN: [id number]  Restrictions/Precautions:    Medical/Treatment Diagnosis Information:  · Diagnosis: s/p R ankle trimalleolar fracture / ORIF 20  · Treatment Diagnosis: M25.571, M25.674, G45.14  Insurance/Certification information:  PT Insurance Information: [place]  Physician Information:  Referring Practitioner: Franci Marrero  Has the plan of care been signed (Y/N):        []  Yes  [x]  No     Date of Patient follow up with Physician: unknown    Is this a Progress Report:     []  Yes  [x]  No      If Yes:  Date Range for reporting period:  Initial Eval: 2020  Beginnin2020 --- Endin21    Progress report will be due (10 Rx or 30 days whichever is less): 3/98/26     Recertification will be due (POC Duration  / 90 days whichever is less): 3/29/21      Visit # Insurance Allowable Auth Required   In Person 1 30 []  Yes     []  No    Tele Health 0  []  Yes     []  No    Total 1       Functional Scale: LEFS: 60% (Score: 32/80)   Date assessed: 2020      Latex Allergy:  [x]NO      []YES  Preferred Language for Healthcare:   [x]English       []other:    Pain level:  4/10     SUBJECTIVE:  See eval    OBJECTIVE: See eval   Observation:    Test measurements:      RESTRICTIONS/PRECAUTIONS: WBAT in boot    Exercises/Interventions:   Therapeutic Ex (54270)  Therapeutic Activity (70197)  NMR re-education (05034) Sets/Reps Notes/CUES   Bike          Ankle ABCs 1x    Ankle circles 20x         Seated toe raises 20x    Seated heel raises 20x    Seated towel scrunches 3'                                                      Gait  10' Cues for step through, crutch training                            Manual Intervention (43631)     Light talocrural joint mob  Grade II/III 5'    Light soft tissue for swelling management 5'                                            Patient Education         Therapeutic Exercise and NMR EXR  [x] (60944) Provided verbal/tactile cueing for activities related to strengthening, flexibility, endurance, ROM for improvements in LE, proximal hip, and core control with self care, mobility, lifting, ambulation. [x] (06352) Provided verbal/tactile cueing for activities related to improving balance, coordination, kinesthetic sense, posture, motor skill, proprioception to assist with LE, proximal hip, and core control in self-care, mobility, lifting, ambulation and eccentric single leg control. NMR and Therapeutic Activities:    [x] (99554 or 36999) Provided verbal/tactile cueing for activities related to improving balance, coordination, kinesthetic sense, posture, motor skill, proprioception and motor activation to allow for proper function of core, proximal hip and LE with self-care and ADLs and functional mobility.    [x] (22819) Gait Re-education- Provided training and instruction to the patient for proper LE, core and proximal hip recruitment and positioning and eccentric body weight control with ambulation re-education including up and down stairs     Home Exercise Program:    [x] (59818) Reviewed/Progressed HEP activities related to strengthening, flexibility, endurance, ROM of core, proximal hip and LE for functional self-care, mobility, lifting and ambulation/stair navigation   [x] (76171) Reviewed/Progressed HEP activities related to improving balance, coordination, kinesthetic sense, posture, motor skill, proprioception of core, proximal hip and LE for self-care, mobility, lifting, and ambulation/stair navigation      Manual Treatments:  PROM / STM / Oscillations-Mobs:  G-I, II, III, IV (PA's, Inf., Post.)  [x] (70299) Provided manual therapy to mobilize LE, proximal hip and/or LS spine soft tissue/joints for the purpose of modulating pain, promoting relaxation, increasing ROM, reducing/eliminating soft tissue swelling/inflammation/restriction, improving soft tissue extensibility and allowing for proper ROM for normal function with self-care, mobility, lifting and ambulation. Modalities:      Charges:  Timed Code Treatment Minutes: 40   Total Treatment Minutes:  60   BWC:  TE TIME:  NMR TIME:  MANUAL TIME:  UNTIMED MINUTES:  Medicare Total:    n/a  n/a  n/a  n/a  n/a      [x] EVAL (LOW) 24255 (typically 20 minutes face-to-face)  [] EVAL (MOD) 80258 (typically 30 minutes face-to-face)  [] EVAL (HIGH) 84039 (typically 45 minutes face-to-face)  [] RE-EVAL     [x] SZ(19763) x     [] IONTO  [] NMR (32397) x     [] VASO  [x] Manual (19488) x     [x] Other: gait x 1  [] TA x      [] Mech Traction (28895)  [] ES(attended) (36747)      [] ES (un) (18496):    ASSESSMENT:  See eval    GOALS:   Short Term Goals: To be achieved in: 2 weeks  1. Independent in HEP and progression per patient tolerance, in order to prevent re-injury. []? Progressing: []? Met: []? Not Met: []? Adjusted       2. Patient will have a decrease in pain to facilitate improvement in movement, function, and ADLs as indicated by Functional Deficits. []? Progressing: []? Met: []? Not Met: []? Adjusted          Long Term Goals: To be achieved in: 12 weeks  1. Disability index score of 20% or less for the LEFS to assist with reaching prior level of function. []? Progressing: []? Met: []? Not Met: []? Adjusted      2. Patient will demonstrate increased AROM to equal the opposite side bilaterally to allow for proper joint functioning as indicated by patients Functional Deficits. []? Progressing: []? Met: []? Not Met: []? Adjusted       3. Patient will demonstrate an increase in strength to 5/5 to allow for proper functional mobility as indicated by patients Functional Deficits. []? Progressing: []? Met: []? Not Met: []? Adjusted       4.  Patient will return to all transfers, work activities, and functional activities without increased symptoms or restriction. []? Progressing: []? Met: []? Not Met: []? Adjusted       5. Patient will have 0/10 pain with ADL's.  []? Progressing: []? Met: []? Not Met: []? Adjusted       6. Patient stated goal: Patient wants to be able to walk and hike on uneven ground without AD x 30 minutes without pain or restriction to be able to return to prior level of function. []? Progressing: []? Met: []? Not Met: []? Adjusted                    (Cut /Paste from eval)    Overall Progression Towards Functional goals/ Treatment Progress Update:  [] Patient is progressing as expected towards functional goals listed. [] Progression is slowed due to complexities/Impairments listed. [] Progression has been slowed due to co-morbidities.   [x] Plan just implemented, too soon to assess goals progression <30days   [] Goals require adjustment due to lack of progress  [] Patient is not progressing as expected and requires additional follow up with physician  [] Other    Prognosis for POC: [x] Good [] Fair  [] Poor    Patient requires continued skilled intervention: [x] Yes  [] No    Treatment/Activity Tolerance:  [x] Patient able to complete treatment  [] Patient limited by fatigue  [] Patient limited by pain    [] Patient limited by other medical complications  [] Other:     Return to Play: (if applicable)   []  Stage 1: Intro to Strength   []  Stage 2: Return to Run and Strength   []  Stage 3: Return to Jump and Strength   []  Stage 4: Dynamic Strength and Agility   []  Stage 5: Sport Specific Training     []  Ready to Return to Play, Meets All Above Stages   []  Not Ready for Return to Sports   Comments:                         PLAN: See eval  [] Continue per plan of care [] Alter current plan (see comments above)  [x] Plan of care initiated [] Hold pending MD visit [] Discharge    Electronically signed by:  Lety Guajardo PT    Note: If patient does not return for scheduled/ recommended follow up visits, this note will serve as a discharge from care along with most recent update on progress.

## 2021-01-05 ENCOUNTER — HOSPITAL ENCOUNTER (OUTPATIENT)
Dept: PHYSICAL THERAPY | Age: 46
Setting detail: THERAPIES SERIES
Discharge: HOME | End: 2021-01-05
Payer: COMMERCIAL

## 2021-01-05 PROCEDURE — 97112 NEUROMUSCULAR REEDUCATION: CPT | Performed by: PHYSICAL THERAPY ASSISTANT

## 2021-01-05 PROCEDURE — 97140 MANUAL THERAPY 1/> REGIONS: CPT | Performed by: PHYSICAL THERAPY ASSISTANT

## 2021-01-05 PROCEDURE — 97110 THERAPEUTIC EXERCISES: CPT | Performed by: PHYSICAL THERAPY ASSISTANT

## 2021-01-05 NOTE — FLOWSHEET NOTE
[facility], [address] [facility], [address]    Physical Therapy Treatment Note/ Progress Report:     Date:  2021    Patient Name:  Jon Collado    :  1975  MRN: [id number]  Restrictions/Precautions:    Medical/Treatment Diagnosis Information:  · Diagnosis: s/p R ankle trimalleolar fracture / ORIF 20  · Treatment Diagnosis: M25.571, M25.674, I55.52  Insurance/Certification information:  PT Insurance Information: [place]  Physician Information:  Referring Practitioner: Sanjay Adams  Has the plan of care been signed (Y/N):        []  Yes  [x]  No     Date of Patient follow up with Physician: unknown    Is this a Progress Report:     []  Yes  [x]  No      If Yes:  Date Range for reporting period:  Initial Eval: 2020  Beginnin2020 --- Endin21    Progress report will be due (10 Rx or 30 days whichever is less): 46     Recertification will be due (POC Duration  / 90 days whichever is less): 3/29/21      Visit # Insurance Allowable Auth Required   In Person  - 2021 30 []  Yes     []  No    [facility] 0  []  Yes     []  No    Total 1       Functional Scale: LEFS: 60% (Score: 32/80)   Date assessed: 2021      Latex Allergy:  [x]NO      []YES  Preferred Language for Healthcare:   [x]English       []other:    Pain level:  4/10     SUBJECTIVE: Did OK after 1st visit. States that he has been unable to bear weight through the foot in the boot b/c he is feeling a lot of tightness and discomfort through the bottom of the foot. Discussed the importance of getting this process started so that we can decrease sensitivity and increase the tolerance of the foot on the floor. 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      OBJECTIVE: See eval   Observation:    Test measurements:      RESTRICTIONS/PRECAUTIONS: WBAT in boot    Exercises/Interventions:   Therapeutic Ex (99993)  Therapeutic Activity (66385)  NMR re-education (88324) Sets/Reps Notes/CUES   Bike          Weight shifts lateral  10x10\"    Forward/back weight shifts  10x10\"         Leg Press (in boot)  80# x30 B / 80# x20 right only         Ankle ABCs 1x    Ankle circles 20x         Seated toe raises 20x    Seated heel raises 20x    Seated towel scrunches 3'         1/2 moon PF /DF  And INV/EVR X20 / x20         T-Band 4 way  X20 ea yellow         SLR / ABD SLR  X20 ea     SAQ / LAQ  X20 / x20                    Gait  10' Cues for step through, crutch training                            Manual Intervention (90475)     Light talocrural joint mob  Grade II/III 5'    Light soft tissue for swelling management 5'    CFM for PF  5'                                       Patient Education         Therapeutic Exercise and NMR EXR  [x] (49769) Provided verbal/tactile cueing for activities related to strengthening, flexibility, endurance, ROM for improvements in LE, proximal hip, and core control with self care, mobility, lifting, ambulation. [x] (95847) Provided verbal/tactile cueing for activities related to improving balance, coordination, kinesthetic sense, posture, motor skill, proprioception to assist with LE, proximal hip, and core control in self-care, mobility, lifting, ambulation and eccentric single leg control. NMR and Therapeutic Activities:    [x] (48406 or 48662) Provided verbal/tactile cueing for activities related to improving balance, coordination, kinesthetic sense, posture, motor skill, proprioception and motor activation to allow for proper function of core, proximal hip and LE with self-care and ADLs and functional mobility.    [x] (48938) Gait Re-education- Provided training and instruction to the patient for proper LE, core and proximal hip recruitment and positioning and eccentric body weight control with ambulation re-education including up and down stairs     Home Exercise Program:    [x] (09642) Reviewed/Progressed HEP activities related to strengthening, flexibility, endurance, ROM of core, proximal hip and LE for functional self-care, mobility, lifting and ambulation/stair navigation   [x] (03797) Reviewed/Progressed HEP activities related to improving balance, coordination, kinesthetic sense, posture, motor skill, proprioception of core, proximal hip and LE for self-care, mobility, lifting, and ambulation/stair navigation      Manual Treatments:  PROM / STM / Oscillations-Mobs:  G-I, II, III, IV (PA's, Inf., Post.)  [x] (04780) Provided manual therapy to mobilize LE, proximal hip and/or LS spine soft tissue/joints for the purpose of modulating pain, promoting relaxation, increasing ROM, reducing/eliminating soft tissue swelling/inflammation/restriction, improving soft tissue extensibility and allowing for proper ROM for normal function with self-care, mobility, lifting and ambulation. Modalities:      Charges:  Timed Code Treatment Minutes: 60   Total Treatment Minutes:  70   BWC:  TE TIME:  NMR TIME:  MANUAL TIME:  UNTIMED MINUTES:  Medicare Total:    n/a  n/a  n/a  n/a  n/a      [] EVAL (LOW) 90749 (typically 20 minutes face-to-face)  [] EVAL (MOD) 27806 (typically 30 minutes face-to-face)  [] EVAL (HIGH) 92645 (typically 45 minutes face-to-face)  [] RE-EVAL     [x] AK(09676) x  2   [] IONTO  [x] NMR (98919) x1     [] VASO  [x] Manual (54177) x     [] Other: gait  [] TA x      [] Mech Traction (67532)  [] ES(attended) (96516)      [] ES (un) (06512):    ASSESSMENT:  See eval    GOALS:   Short Term Goals: To be achieved in: 2 weeks  1. Independent in HEP and progression per patient tolerance, in order to prevent re-injury. [x]? Progressing: []? Met: []? Not Met: []? Adjusted       2. Patient will have a decrease in pain to facilitate improvement in movement, function, and ADLs as indicated by Functional Deficits. [x]? Progressing: []?  Met: []? Not Met: []? Adjusted          Long Term Goals: To be achieved in: 12 weeks  1. Disability index score of 20% or less for the LEFS to assist with reaching prior level of function. [x]? Progressing: []? Met: []? Not Met: []? Adjusted      2. Patient will demonstrate increased AROM to equal the opposite side bilaterally to allow for proper joint functioning as indicated by patients Functional Deficits. [x]? Progressing: []? Met: []? Not Met: []? Adjusted       3. Patient will demonstrate an increase in strength to 5/5 to allow for proper functional mobility as indicated by patients Functional Deficits. [x]? Progressing: []? Met: []? Not Met: []? Adjusted       4. Patient will return to all transfers, work activities, and functional activities without increased symptoms or restriction. [x]? Progressing: []? Met: []? Not Met: []? Adjusted       5. Patient will have 0/10 pain with ADL's. [x]? Progressing: []? Met: []? Not Met: []? Adjusted       6. Patient stated goal: Patient wants to be able to walk and hike on uneven ground without AD x 30 minutes without pain or restriction to be able to return to prior level of function. [x]? Progressing: []? Met: []? Not Met: []? Adjusted                    (Cut /Paste from eval)    Overall Progression Towards Functional goals/ Treatment Progress Update:  [x] Patient is progressing as expected towards functional goals listed. [] Progression is slowed due to complexities/Impairments listed. [] Progression has been slowed due to co-morbidities.   [] Plan just implemented, too soon to assess goals progression <30days   [] Goals require adjustment due to lack of progress  [] Patient is not progressing as expected and requires additional follow up with physician  [] Other    Prognosis for POC: [x] Good [] Fair  [] Poor    Patient requires continued skilled intervention: [x] Yes  [] No    Treatment/Activity Tolerance:  [x] Patient able to complete treatment  [] Patient limited by fatigue  [] Patient limited by pain    [] Patient limited by other medical complications  [] Other:     Return to Play: (if applicable)   []  Stage 1: Intro to Strength   []  Stage 2: Return to Run and Strength   []  Stage 3: Return to Jump and Strength   []  Stage 4: Dynamic Strength and Agility   []  Stage 5: Sport Specific Training     []  Ready to Return to Play, Meets All Above Stages   []  Not Ready for Return to Sports   Comments:                         PLAN: See eval  [x] Continue per plan of care [] Alter current plan (see comments above)  [] Plan of care initiated [] Hold pending MD visit [] Discharge    Electronically signed by:  Anyi Garcia PTA    Note: If patient does not return for scheduled/ recommended follow up visits, this note will serve as a discharge from care along with most recent update on progress.

## 2021-01-08 ENCOUNTER — HOSPITAL ENCOUNTER (OUTPATIENT)
Dept: PHYSICAL THERAPY | Age: 46
Setting detail: THERAPIES SERIES
Discharge: HOME | End: 2021-01-08
Payer: COMMERCIAL

## 2021-01-08 PROCEDURE — 97110 THERAPEUTIC EXERCISES: CPT

## 2021-01-08 PROCEDURE — 97112 NEUROMUSCULAR REEDUCATION: CPT

## 2021-01-08 PROCEDURE — 97140 MANUAL THERAPY 1/> REGIONS: CPT

## 2021-01-08 NOTE — FLOWSHEET NOTE
[facility], [address] [place], [place] [id number]    Physical Therapy Treatment Note/ Progress Report:     Date:  2021    Patient Name:  Trudy Méndez    :  1975  MRN: [id number]  Restrictions/Precautions:    Medical/Treatment Diagnosis Information:  · Diagnosis: s/p R ankle trimalleolar fracture / ORIF 20  · Treatment Diagnosis: M25.571, M25.674, D17.67  Insurance/Certification information:  PT Insurance Information: [employer]  Physician Information:  Referring Practitioner: Milton Kinsey  Has the plan of care been signed (Y/N):        []  Yes  [x]  No     Date of Patient follow up with Physician: unknown    Is this a Progress Report:     []  Yes  [x]  No      If Yes:  Date Range for reporting period:  Initial Eval: 2020  Beginnin2020 --- Endin21    Progress report will be due (10 Rx or 30 days whichever is less): 87     Recertification will be due (POC Duration  / 90 days whichever is less): 3/29/21      Visit # Insurance Allowable Auth Required   In Person  - 2021 30 []  Yes     []  No    [facility] 0  []  Yes     []  No    Total 2       Functional Scale: LEFS: 60% (Score: 32/80)   Date assessed: 2021      Latex Allergy:  [x]NO      []YES  Preferred Language for Healthcare:   [x]English       []other:    Pain level:  4/10     SUBJECTIVE: Patient reports he has been doing better with walking in the boot. He still needs the crutches, but is able to walk without pain when using the crutches. The numbness/tingling has also significantly improved. He has been working on all of his exercises and is eager to get off the crutches. He tried walking a few steps without the crutches at school today, but it was tender, so educated about continuation of bilat crutches as needed. Recommended putting as much weight through the RLE as possible without increasing pain and putting rest of weight through his arms.      OBJECTIVE: See eval   Observation:  Test measurements:      RESTRICTIONS/PRECAUTIONS: WBAT in boot    Exercises/Interventions:   Therapeutic Ex (82085)  Therapeutic Activity (19673)  NMR re-education (29795) Sets/Reps Notes/CUES   Bike          Weight shifts lateral in boot with UE support 10x10\"    Forward/back weight shifts in boot with UE support 10x10\"         Leg Press (in boot)  100# x30 B / 80# x20 right only    Gastroc / soleus stretch belt 3x30\" / 3x30\"              Ankle ABCs 1x    Ankle circles 20x         Seated toe raises 20x    Seated heel raises 20x    Seated towel scrunches 3'         1/2 moon PF /DF  And INV/EVR X20 / x20         T-Band 4 way  X20 ea yellow         SLR flexion / ABD / extension / ADD 10-20 x5\" hold ea     LAQ  x20 12.5#    Bridges 20x5\"              Gait  10' Cues for step through, crutch training             Access code:[email or web address]               Manual Intervention (62370)     Light talocrural joint mob  Grade II/III 5'    Light soft tissue for swelling management 5'    CFM for PF  5'                                       Patient Education         Therapeutic Exercise and NMR EXR  [x] (71900) Provided verbal/tactile cueing for activities related to strengthening, flexibility, endurance, ROM for improvements in LE, proximal hip, and core control with self care, mobility, lifting, ambulation. [x] (31643) Provided verbal/tactile cueing for activities related to improving balance, coordination, kinesthetic sense, posture, motor skill, proprioception to assist with LE, proximal hip, and core control in self-care, mobility, lifting, ambulation and eccentric single leg control. NMR and Therapeutic Activities:    [x] (47880 or 36111) Provided verbal/tactile cueing for activities related to improving balance, coordination, kinesthetic sense, posture, motor skill, proprioception and motor activation to allow for proper function of core, proximal hip and LE with self-care and ADLs and functional mobility.    [x] (93203) to put more weight through RLE at each session. GOALS:   Short Term Goals: To be achieved in: 2 weeks  1. Independent in HEP and progression per patient tolerance, in order to prevent re-injury. [x]? Progressing: []? Met: []? Not Met: []? Adjusted       2. Patient will have a decrease in pain to facilitate improvement in movement, function, and ADLs as indicated by Functional Deficits. [x]? Progressing: []? Met: []? Not Met: []? Adjusted          Long Term Goals: To be achieved in: 12 weeks  1. Disability index score of 20% or less for the LEFS to assist with reaching prior level of function. [x]? Progressing: []? Met: []? Not Met: []? Adjusted      2. Patient will demonstrate increased AROM to equal the opposite side bilaterally to allow for proper joint functioning as indicated by patients Functional Deficits. [x]? Progressing: []? Met: []? Not Met: []? Adjusted       3. Patient will demonstrate an increase in strength to 5/5 to allow for proper functional mobility as indicated by patients Functional Deficits. [x]? Progressing: []? Met: []? Not Met: []? Adjusted       4. Patient will return to all transfers, work activities, and functional activities without increased symptoms or restriction. [x]? Progressing: []? Met: []? Not Met: []? Adjusted       5. Patient will have 0/10 pain with ADL's. [x]? Progressing: []? Met: []? Not Met: []? Adjusted       6. Patient stated goal: Patient wants to be able to walk and hike on uneven ground without AD x 30 minutes without pain or restriction to be able to return to prior level of function. [x]? Progressing: []? Met: []? Not Met: []? Adjusted                    (Cut /Paste from eval)    Overall Progression Towards Functional goals/ Treatment Progress Update:  [x] Patient is progressing as expected towards functional goals listed. [] Progression is slowed due to complexities/Impairments listed. [] Progression has been slowed due to co-morbidities.   [] Plan just implemented, too soon to assess goals progression <30days   [] Goals require adjustment due to lack of progress  [] Patient is not progressing as expected and requires additional follow up with physician  [] Other    Prognosis for POC: [x] Good [] Fair  [] Poor    Patient requires continued skilled intervention: [x] Yes  [] No    Treatment/Activity Tolerance:  [x] Patient able to complete treatment  [] Patient limited by fatigue  [] Patient limited by pain    [] Patient limited by other medical complications  [] Other:     Return to Play: (if applicable)   []  Stage 1: Intro to Strength   []  Stage 2: Return to Run and Strength   []  Stage 3: Return to Jump and Strength   []  Stage 4: Dynamic Strength and Agility   []  Stage 5: Sport Specific Training     []  Ready to Return to Play, Meets All Above Stages   []  Not Ready for Return to Sports   Comments:                         PLAN: See eval  [x] Continue per plan of care [] Alter current plan (see comments above)  [] Plan of care initiated [] Hold pending MD visit [] Discharge    Electronically signed by:  Sola Garcia PT    Note: If patient does not return for scheduled/ recommended follow up visits, this note will serve as a discharge from care along with most recent update on progress.

## 2021-01-11 ENCOUNTER — OFFICE VISIT (OUTPATIENT)
Dept: ORTHOPEDIC SURGERY | Age: 46
End: 2021-01-11
Payer: COMMERCIAL

## 2021-01-11 VITALS — WEIGHT: 200 LBS | BODY MASS INDEX: 28.63 KG/M2 | HEIGHT: 70 IN

## 2021-01-11 DIAGNOSIS — S82.891A CLOSED FRACTURE OF RIGHT ANKLE, INITIAL ENCOUNTER: Primary | ICD-10-CM

## 2021-01-11 PROCEDURE — 99024 POSTOP FOLLOW-UP VISIT: CPT | Performed by: PHYSICIAN ASSISTANT

## 2021-01-11 PROCEDURE — L1902 AFO ANKLE GAUNTLET PRE OTS: HCPCS | Performed by: PHYSICIAN ASSISTANT

## 2021-01-11 NOTE — PROGRESS NOTES
Surgery: Right ankle ORIF trimalleolar ankle fracture    Post-Op Week: 8.5    Chief Complaint:  Post-Op Check (Right ankle fx SX 11/13/2020)      History of Present of Illness: Mey Doan comes in today doing really quite well. He continues to use his walking boot and 2 crutches today. He reports he does not use the crutches all the time. At his last visit we instructed him that he can be weightbearing as tolerated in the boot and he reports that has been progressing slowly. He began working with physical therapy and feels that he is getting some of his range of motion back. He is still very tentative as to how much pressure he puts on his foot. Review of Systems  Pertinent items are noted in HPI  Denies fever, chills, confusion, bowel/bladder active change. Review of systems reviewed from Patient History Form dated on 11/12/2020 and available in the patient's chart under the Media tab. Examination:  On exam today he has mild swelling. Steri-Strips were removed today. Intact neurovascular exam.  Tolerates very gentle tibiotalar movement. Brisk capillary refill. Negative Екатерина. Incisions have healed nicely. Radiology:     X-rays obtained reviewed the office today include 3 views of the right ankle. He has intact mortise. Anatomic alignment. No hardware complications. Orders Placed This Encounter   Procedures    XR ANKLE RIGHT (MIN 3 VIEWS)    Los Ankle Brace     Patient was prescribed a Los Ankle Brace. The right ankle will require stabilization / immobilization from this semi-rigid / rigid orthosis to improve their function. The orthosis will assist in protecting the affected area, provide functional support and facilitate healing. Patient was instructed to progress ambulation weight bearing as tolerated in the device. The patient was educated and fit by a healthcare professional with expert knowledge and specialization in brace application while under the direct supervision of the treating physician. Verbal and written instructions for the use of and application of this item were provided. They were instructed to contact the office immediately should the brace result in increased pain, decreased sensation, increased swelling or worsening of the condition. Impression:  Right ankle status post ORIF       Treatment Plan: At this time, he can discontinue the use of his crutches and continue to weight-bear as tolerated in the boot. Would like to get him fitted for a lace up ankle support today that he can transition into in approximately 1 week with the help of physical therapy. At this time, we encouraged him to continue with physical therapy working on his range of motion and ambulation. Patient will follow-up with us in approximately 4 weeks and we can reevaluate him again at that time. [facility]  A Partner of [facility] ([facility])      This dictation was performed with a verbal recognition program (DRAGON) and it was checked for errors. It is possible that there are still dictated errors within this office note. If so, please bring any errors to my attention for an addendum. All efforts were made to ensure that this office note is accurate.

## 2021-01-13 ENCOUNTER — HOSPITAL ENCOUNTER (OUTPATIENT)
Dept: PHYSICAL THERAPY | Age: 46
Setting detail: THERAPIES SERIES
Discharge: HOME | End: 2021-01-13
Payer: COMMERCIAL

## 2021-01-13 PROCEDURE — 97140 MANUAL THERAPY 1/> REGIONS: CPT | Performed by: SPECIALIST

## 2021-01-13 PROCEDURE — 97016 VASOPNEUMATIC DEVICE THERAPY: CPT | Performed by: SPECIALIST

## 2021-01-13 PROCEDURE — 97110 THERAPEUTIC EXERCISES: CPT | Performed by: SPECIALIST

## 2021-01-13 PROCEDURE — 97112 NEUROMUSCULAR REEDUCATION: CPT | Performed by: SPECIALIST

## 2021-01-13 NOTE — FLOWSHEET NOTE
[facility], [address] [place] [address] [address], [id number]    Physical Therapy Treatment Note/ Progress Report:     Date:  2021    Patient Name:  Francesco Ferris    :  1975  MRN: [id number]  Restrictions/Precautions:    Medical/Treatment Diagnosis Information:  · Diagnosis: s/p R ankle trimalleolar fracture / ORIF 20  · Treatment Diagnosis: M25.571, M25.674, H33.88  Insurance/Certification information:  PT Insurance Information: [employer]  Physician Information:  Referring Practitioner: Michael Steven  Has the plan of care been signed (Y/N):        []  Yes  [x]  No     Date of Patient follow up with Physician: unknown    Is this a Progress Report:     []  Yes  [x]  No      If Yes:  Date Range for reporting period:  Initial Eval: 2020  Beginnin2020 --- Endin21    Progress report will be due (10 Rx or 30 days whichever is less): 79     Recertification will be due (POC Duration  / 90 days whichever is less): 3/29/21      Visit # Insurance Allowable Auth Required   In Person 3 - 2021 30 []  Yes     []  No    Tele Health 0  []  Yes     []  No    Total 3       Functional Scale: LEFS: 60% (Score: 32/80)   Date assessed: 2021      Latex Allergy:  [x]NO      []YES  Preferred Language for Healthcare:   [x]English       []other:    Pain level:  4/10     SUBJECTIVE: Patient reports he has been doing better with walking in the boot. He still needs the crutches, but is able to walk without pain when using the crutches. The numbness/tingling has also significantly improved. He has been working on all of his exercises and is eager to get off the crutches. He tried walking a few steps without the crutches at school today, but it was tender, so educated about continuation of bilat crutches as needed. Recommended putting as much weight through the RLE as possible without increasing pain and putting rest of weight through his arms.      OBJECTIVE:    Observation:    Test measurements:     1/13/21 DF 3 EV 6    RESTRICTIONS/PRECAUTIONS: WBAT in boot on 1/18/21 progress to Los brace as tolerated     Exercises/Interventions:   Therapeutic Ex (82834)  Therapeutic Activity (23600)  NMR re-education (28399) Sets/Reps Notes/CUES   Bike NV         Weight shifts lateral in boot with UE support 10x10\"    Forward/back weight shifts in boot with UE support 10x10\"         Leg Press (in boot)  100# x30 B / 80# x20 right only    Gastroc / soleus stand 3x30\" / 3x30\"         Piriformis stretch 30\" 2x               Seated toe raises 20x    Seated heel raises 20x    Seated towel scrunches 3'    Towel stretch Inv/ev 20\"/2x    1/2 moon PF /DF  And INV/EVR X20 / x20         T-Band 4 way  X20 ea red         SLR flexion / ABD / extension / ADD 10-20 x5\" hold ea     LAQ  x20 12.5#    Bridges 20x5\"              Gait  10' Cues for step through, crutch training             Access code:[email or web address]               Manual Intervention (13208)     Light talocrural joint mob  Grade II/III 5'    Light soft tissue for swelling management 5'    CFM for PF  5'    Gentle ROM 5'                                  Patient Education         Therapeutic Exercise and NMR EXR  [x] (50671) Provided verbal/tactile cueing for activities related to strengthening, flexibility, endurance, ROM for improvements in LE, proximal hip, and core control with self care, mobility, lifting, ambulation. [x] (31546) Provided verbal/tactile cueing for activities related to improving balance, coordination, kinesthetic sense, posture, motor skill, proprioception to assist with LE, proximal hip, and core control in self-care, mobility, lifting, ambulation and eccentric single leg control.      NMR and Therapeutic Activities:    [x] (01358 or 99296) Provided verbal/tactile cueing for activities related to improving balance, coordination, kinesthetic sense, posture, motor skill, proprioception and motor activation to allow for proper function of core, proximal hip and LE with self-care and ADLs and functional mobility. [x] (46748) Gait Re-education- Provided training and instruction to the patient for proper LE, core and proximal hip recruitment and positioning and eccentric body weight control with ambulation re-education including up and down stairs     Home Exercise Program:    [x] (68369) Reviewed/Progressed HEP activities related to strengthening, flexibility, endurance, ROM of core, proximal hip and LE for functional self-care, mobility, lifting and ambulation/stair navigation   [x] (03571) Reviewed/Progressed HEP activities related to improving balance, coordination, kinesthetic sense, posture, motor skill, proprioception of core, proximal hip and LE for self-care, mobility, lifting, and ambulation/stair navigation      Manual Treatments:  PROM / STM / Oscillations-Mobs:  G-I, II, III, IV (PA's, Inf., Post.)  [x] (51148) Provided manual therapy to mobilize LE, proximal hip and/or LS spine soft tissue/joints for the purpose of modulating pain, promoting relaxation, increasing ROM, reducing/eliminating soft tissue swelling/inflammation/restriction, improving soft tissue extensibility and allowing for proper ROM for normal function with self-care, mobility, lifting and ambulation.      Modalities:  Game ready x 10 min post-session    Charges:  Timed Code Treatment Minutes: 60   Total Treatment Minutes:  70   BWC:  TE TIME:  NMR TIME:  MANUAL TIME:  UNTIMED MINUTES:  Medicare Total:    n/a  n/a  n/a  n/a  n/a      [] EVAL (LOW) 07261 (typically 20 minutes face-to-face)  [] EVAL (MOD) 93021 (typically 30 minutes face-to-face)  [] EVAL (HIGH) 60802 (typically 45 minutes face-to-face)  [] RE-EVAL     [x] GL(79515) x  2   [] IONTO  [x] NMR (41113) x1     [x] VASO  [x] Manual (20669) x 1    [] Other: gait  [] TA x      [] Mech Traction (67839)  [] ES(attended) (77974)      [] ES (un) (94027):    ASSESSMENT:  Patient tolerated all exercises without increase in pain.Soreness with manuals and MFR. GOALS:   Short Term Goals: To be achieved in: 2 weeks  1. Independent in HEP and progression per patient tolerance, in order to prevent re-injury. [x]? Progressing: []? Met: []? Not Met: []? Adjusted       2. Patient will have a decrease in pain to facilitate improvement in movement, function, and ADLs as indicated by Functional Deficits. [x]? Progressing: []? Met: []? Not Met: []? Adjusted          Long Term Goals: To be achieved in: 12 weeks  1. Disability index score of 20% or less for the LEFS to assist with reaching prior level of function. [x]? Progressing: []? Met: []? Not Met: []? Adjusted      2. Patient will demonstrate increased AROM to equal the opposite side bilaterally to allow for proper joint functioning as indicated by patients Functional Deficits. [x]? Progressing: []? Met: []? Not Met: []? Adjusted       3. Patient will demonstrate an increase in strength to 5/5 to allow for proper functional mobility as indicated by patients Functional Deficits. [x]? Progressing: []? Met: []? Not Met: []? Adjusted       4. Patient will return to all transfers, work activities, and functional activities without increased symptoms or restriction. [x]? Progressing: []? Met: []? Not Met: []? Adjusted       5. Patient will have 0/10 pain with ADL's. [x]? Progressing: []? Met: []? Not Met: []? Adjusted       6. Patient stated goal: Patient wants to be able to walk and hike on uneven ground without AD x 30 minutes without pain or restriction to be able to return to prior level of function. [x]? Progressing: []? Met: []? Not Met: []? Adjusted                    (Cut /Paste from eval)    Overall Progression Towards Functional goals/ Treatment Progress Update:  [x] Patient is progressing as expected towards functional goals listed. [] Progression is slowed due to complexities/Impairments listed. [] Progression has been slowed due to co-morbidities.   [] Plan just

## 2021-01-15 ENCOUNTER — HOSPITAL ENCOUNTER (OUTPATIENT)
Dept: PHYSICAL THERAPY | Age: 46
Setting detail: THERAPIES SERIES
Discharge: HOME | End: 2021-01-15
Payer: COMMERCIAL

## 2021-01-15 PROCEDURE — 97110 THERAPEUTIC EXERCISES: CPT

## 2021-01-15 PROCEDURE — 97140 MANUAL THERAPY 1/> REGIONS: CPT

## 2021-01-15 PROCEDURE — 97112 NEUROMUSCULAR REEDUCATION: CPT

## 2021-01-15 NOTE — FLOWSHEET NOTE
Count includes the [facility], [address] [place] [address] Nayan Chavarria, [address]    Physical Therapy Treatment Note/ Progress Report:     Date:  1/15/2021    Patient Name:  Geovanny Cruz    :  1975  MRN: [id number]  Restrictions/Precautions:    Medical/Treatment Diagnosis Information:  · Diagnosis: s/p R ankle trimalleolar fracture / ORIF 20  · Treatment Diagnosis: M25.571, M25.674, G79.20  Insurance/Certification information:  PT Insurance Information: [place]  Physician Information:  Referring Practitioner: Valorie Jimenez  Has the plan of care been signed (Y/N):        []  Yes  [x]  No     Date of Patient follow up with Physician: unknown    Is this a Progress Report:     []  Yes  [x]  No      If Yes:  Date Range for reporting period:  Initial Eval: 2020  Beginnin2020 --- Endin21    Progress report will be due (10 Rx or 30 days whichever is less):      Recertification will be due (POC Duration  / 90 days whichever is less): 3/29/21      Visit # Insurance Allowable Auth Required   In Person 2021 30 []  Yes     []  No    Tele Health 0  []  Yes     []  No    Total 5       Functional Scale: LEFS: 60% (Score: 32/80)   Date assessed: 1/15/2021      Latex Allergy:  [x]NO      []YES  Preferred Language for Healthcare:   [x]English       []other:    Pain level:  4/10     SUBJECTIVE: Patient reports he is doing well and is looking forward to starting the transition out of the boot next week. He has been working hard on exercises and requests to just focus on the ankle/gait/weightbearing in therapy.     OBJECTIVE:    Observation:    Test measurements:     1/15/21 DF 4 EV 6    RESTRICTIONS/PRECAUTIONS: WBAT in boot on 21 progress to Los brace as tolerated per MD note     Exercises/Interventions:   Therapeutic Ex (87053)  Therapeutic Activity (96551)  NMR re-education (09535) Sets/Reps Notes/CUES   Bike 6'         Weight shifts lateral in boot with UE support 10x10\"Crutches Forward/back weight shifts in boot with UE support         Leg Press (in boot)  100# x30 B / 60# x20 right only    Gastroc / soleus stand 3x30\" / 3x30\"         Piriformis stretch 30\" 2x               Seated toe raises 20x    Standing heel raises 20x    Seated towel scrunches 3'    Towel stretch Inv/ev 20\"/2x    BAPS (green ball) PF /DF and INV/EVR circles X20 / x20  x20 CW / x20 CCW         T-Band 4 way  At home       SLR flexion / ABD / extension / ADD Requests to perform at home   LAQ  At home   Bridges At home             Gait  2' Cues for step through, crutch training - tried a couple of steps without boot today with crutches - reports getting \"pinching feeling\" in front of ankle             Access code:[email or web address]               Manual Intervention (08356)     Moderate talocrural joint mob  Grade III 8' In sitting and in standing with movement   Light soft tissue for swelling management 5'    CFM for PF     Gentle ROM                                  Patient Education         Therapeutic Exercise and NMR EXR  [x] (49694) Provided verbal/tactile cueing for activities related to strengthening, flexibility, endurance, ROM for improvements in LE, proximal hip, and core control with self care, mobility, lifting, ambulation. [x] (19390) Provided verbal/tactile cueing for activities related to improving balance, coordination, kinesthetic sense, posture, motor skill, proprioception to assist with LE, proximal hip, and core control in self-care, mobility, lifting, ambulation and eccentric single leg control. NMR and Therapeutic Activities:    [x] (25813 or 25480) Provided verbal/tactile cueing for activities related to improving balance, coordination, kinesthetic sense, posture, motor skill, proprioception and motor activation to allow for proper function of core, proximal hip and LE with self-care and ADLs and functional mobility.    [x] (16051) Gait Re-education- Provided training and instruction to the patient for proper LE, core and proximal hip recruitment and positioning and eccentric body weight control with ambulation re-education including up and down stairs     Home Exercise Program:    [x] (43472) Reviewed/Progressed HEP activities related to strengthening, flexibility, endurance, ROM of core, proximal hip and LE for functional self-care, mobility, lifting and ambulation/stair navigation   [x] (10304) Reviewed/Progressed HEP activities related to improving balance, coordination, kinesthetic sense, posture, motor skill, proprioception of core, proximal hip and LE for self-care, mobility, lifting, and ambulation/stair navigation      Manual Treatments:  PROM / STM / Oscillations-Mobs:  G-I, II, III, IV (PA's, Inf., Post.)  [x] (07932) Provided manual therapy to mobilize LE, proximal hip and/or LS spine soft tissue/joints for the purpose of modulating pain, promoting relaxation, increasing ROM, reducing/eliminating soft tissue swelling/inflammation/restriction, improving soft tissue extensibility and allowing for proper ROM for normal function with self-care, mobility, lifting and ambulation. Modalities:  Game ready x 10 min post-session    Charges:  Timed Code Treatment Minutes: 55   Total Treatment Minutes:  65   BWC:  TE TIME:  NMR TIME:  MANUAL TIME:  UNTIMED MINUTES:  Medicare Total:    n/a  n/a  n/a  n/a  n/a      [] EVAL (LOW) 98055 (typically 20 minutes face-to-face)  [] EVAL (MOD) 19826 (typically 30 minutes face-to-face)  [] EVAL (HIGH) 92228 (typically 45 minutes face-to-face)  [] RE-EVAL     [x] MU(31857) x  2   [] IONTO  [x] NMR (70662) x1     [x] VASO  [x] Manual (07655) x 1    [] Other: gait  [] TA x      [] Mech Traction (96005)  [] ES(attended) (68527)      [] ES (un) (37826):    ASSESSMENT:  Patient tolerated all new exercises well. Had some aching with weight shifts without boot. Will plan to start transition next week to Los brace. GOALS:   Short Term Goals: To be achieved in: 2 weeks  1. Independent in HEP and progression per patient tolerance, in order to prevent re-injury. [x]? Progressing: []? Met: []? Not Met: []? Adjusted       2. Patient will have a decrease in pain to facilitate improvement in movement, function, and ADLs as indicated by Functional Deficits. [x]? Progressing: []? Met: []? Not Met: []? Adjusted          Long Term Goals: To be achieved in: 12 weeks  1. Disability index score of 20% or less for the LEFS to assist with reaching prior level of function. [x]? Progressing: []? Met: []? Not Met: []? Adjusted      2. Patient will demonstrate increased AROM to equal the opposite side bilaterally to allow for proper joint functioning as indicated by patients Functional Deficits. [x]? Progressing: []? Met: []? Not Met: []? Adjusted       3. Patient will demonstrate an increase in strength to 5/5 to allow for proper functional mobility as indicated by patients Functional Deficits. [x]? Progressing: []? Met: []? Not Met: []? Adjusted       4. Patient will return to all transfers, work activities, and functional activities without increased symptoms or restriction. [x]? Progressing: []? Met: []? Not Met: []? Adjusted       5. Patient will have 0/10 pain with ADL's. [x]? Progressing: []? Met: []? Not Met: []? Adjusted       6. Patient stated goal: Patient wants to be able to walk and hike on uneven ground without AD x 30 minutes without pain or restriction to be able to return to prior level of function. [x]? Progressing: []? Met: []? Not Met: []? Adjusted                    (Cut /Paste from eval)    Overall Progression Towards Functional goals/ Treatment Progress Update:  [x] Patient is progressing as expected towards functional goals listed. [] Progression is slowed due to complexities/Impairments listed. [] Progression has been slowed due to co-morbidities.   [] Plan just implemented, too soon to assess goals progression <30days   [] Goals require adjustment due to lack of

## 2021-01-19 ENCOUNTER — HOSPITAL ENCOUNTER (OUTPATIENT)
Dept: PHYSICAL THERAPY | Age: 46
Setting detail: THERAPIES SERIES
Discharge: HOME | End: 2021-01-19
Payer: COMMERCIAL

## 2021-01-19 PROCEDURE — 97112 NEUROMUSCULAR REEDUCATION: CPT | Performed by: PHYSICAL THERAPY ASSISTANT

## 2021-01-19 PROCEDURE — 97110 THERAPEUTIC EXERCISES: CPT | Performed by: PHYSICAL THERAPY ASSISTANT

## 2021-01-19 PROCEDURE — 97140 MANUAL THERAPY 1/> REGIONS: CPT | Performed by: PHYSICAL THERAPY ASSISTANT

## 2021-01-19 NOTE — FLOWSHEET NOTE
[facility], [address] [place] [address] Nayan Chavarria, [address]    Physical Therapy Treatment Note/ Progress Report:     Date:  2021    Patient Name:  Dago White    :  1975  MRN: [id number]  Restrictions/Precautions:    Medical/Treatment Diagnosis Information:  · Diagnosis: s/p R ankle trimalleolar fracture / ORIF 20  · Treatment Diagnosis: M25.571, M25.674, Z41.10  Insurance/Certification information:  PT Insurance Information: [place]  Physician Information:  Referring Practitioner: Shlomo Kimble  Has the plan of care been signed (Y/N):        []  Yes  [x]  No     Date of Patient follow up with Physician: unknown    Is this a Progress Report:     []  Yes  [x]  No      If Yes:  Date Range for reporting period:  Initial Eval: 2020  Beginnin2020 --- Endin21    Progress report will be due (10 Rx or 30 days whichever is less): 3/29/68     Recertification will be due (POC Duration  / 90 days whichever is less): 3/29/21      Visit # Insurance Allowable Auth Required   In Person 2021 30 []  Yes     []  No    [facility] 0  []  Yes     []  No    Total 6       Functional Scale: LEFS: 60% (Score: 32/80)   Date assessed: 2021      Latex Allergy:  [x]NO      []YES  Preferred Language for Healthcare:   [x]English       []other:    Pain level:  4/10     SUBJECTIVE: Feeling better with ambulation. Doing well with full weight bearing in the boot and no crutches.   Brings in shoe and Los brace today  OBJECTIVE:    Observation:    Test measurements:     1/15/21 DF 4 EV 6    RESTRICTIONS/PRECAUTIONS: WBAT in boot on 21 progress to Los brace as tolerated per MD note     Exercises/Interventions:   Therapeutic Ex (73627)  Therapeutic Activity (01784)  NMR re-education (42837) Sets/Reps Notes/CUES   Bike 6'                      HR / Calf stretch  X30/ 3x30\"    Marches  x20    HS Curl / Hip ABD  NV     Mini Squat  x20    FSU  NV    SLB 5\" x6         Leg related to strengthening, flexibility, endurance, ROM of core, proximal hip and LE for functional self-care, mobility, lifting and ambulation/stair navigation   [x] (19864) Reviewed/Progressed HEP activities related to improving balance, coordination, kinesthetic sense, posture, motor skill, proprioception of core, proximal hip and LE for self-care, mobility, lifting, and ambulation/stair navigation      Manual Treatments:  PROM / STM / Oscillations-Mobs:  G-I, II, III, IV (PA's, Inf., Post.)  [x] (23201) Provided manual therapy to mobilize LE, proximal hip and/or LS spine soft tissue/joints for the purpose of modulating pain, promoting relaxation, increasing ROM, reducing/eliminating soft tissue swelling/inflammation/restriction, improving soft tissue extensibility and allowing for proper ROM for normal function with self-care, mobility, lifting and ambulation. Modalities:    Charges:  Timed Code Treatment Minutes: 55   Total Treatment Minutes:  65   BWC:  TE TIME:  NMR TIME:  MANUAL TIME:  UNTIMED MINUTES:  Medicare Total:    n/a  n/a  n/a  n/a  n/a      [] EVAL (LOW) 32453 (typically 20 minutes face-to-face)  [] EVAL (MOD) 64134 (typically 30 minutes face-to-face)  [] EVAL (HIGH) 23551 (typically 45 minutes face-to-face)  [] RE-EVAL     [x] NS(01230) x  2   [] IONTO  [x] NMR (82494) x1     [] VASO  [x] Manual (55911) x 1    [] Other: gait  [] TA x      [] Mech Traction (40006)  [] ES(attended) (20028)      [] ES (un) (65915):    ASSESSMENT:  Patient tolerated all new exercises well. Had some aching with weight shifts without boot. Will plan to start transition next week to Los brace. GOALS:   Short Term Goals: To be achieved in: 2 weeks  1. Independent in HEP and progression per patient tolerance, in order to prevent re-injury. [x]? Progressing: []? Met: []? Not Met: []? Adjusted       2.  Patient will have a decrease in pain to facilitate improvement in movement, function, and ADLs as indicated by Functional Deficits. [x]? Progressing: []? Met: []? Not Met: []? Adjusted          Long Term Goals: To be achieved in: 12 weeks  1. Disability index score of 20% or less for the LEFS to assist with reaching prior level of function. [x]? Progressing: []? Met: []? Not Met: []? Adjusted      2. Patient will demonstrate increased AROM to equal the opposite side bilaterally to allow for proper joint functioning as indicated by patients Functional Deficits. [x]? Progressing: []? Met: []? Not Met: []? Adjusted       3. Patient will demonstrate an increase in strength to 5/5 to allow for proper functional mobility as indicated by patients Functional Deficits. [x]? Progressing: []? Met: []? Not Met: []? Adjusted       4. Patient will return to all transfers, work activities, and functional activities without increased symptoms or restriction. [x]? Progressing: []? Met: []? Not Met: []? Adjusted       5. Patient will have 0/10 pain with ADL's. [x]? Progressing: []? Met: []? Not Met: []? Adjusted       6. Patient stated goal: Patient wants to be able to walk and hike on uneven ground without AD x 30 minutes without pain or restriction to be able to return to prior level of function. [x]? Progressing: []? Met: []? Not Met: []? Adjusted                    (Cut /Paste from eval)    Overall Progression Towards Functional goals/ Treatment Progress Update:  [x] Patient is progressing as expected towards functional goals listed. [] Progression is slowed due to complexities/Impairments listed. [] Progression has been slowed due to co-morbidities.   [] Plan just implemented, too soon to assess goals progression <30days   [] Goals require adjustment due to lack of progress  [] Patient is not progressing as expected and requires additional follow up with physician  [] Other    Prognosis for POC: [x] Good [] Fair  [] Poor    Patient requires continued skilled intervention: [x] Yes  [] No    Treatment/Activity Tolerance:  [x]

## 2021-01-22 ENCOUNTER — HOSPITAL ENCOUNTER (OUTPATIENT)
Dept: PHYSICAL THERAPY | Age: 46
Setting detail: THERAPIES SERIES
Discharge: HOME | End: 2021-01-22
Payer: COMMERCIAL

## 2021-01-22 PROCEDURE — 97110 THERAPEUTIC EXERCISES: CPT

## 2021-01-22 PROCEDURE — 97140 MANUAL THERAPY 1/> REGIONS: CPT

## 2021-01-22 PROCEDURE — 97112 NEUROMUSCULAR REEDUCATION: CPT

## 2021-01-22 NOTE — FLOWSHEET NOTE
[facility], [address] [place] [address] Nayan Chavarria, [address]    Physical Therapy Treatment Note/ Progress Report:     Date:  2021    Patient Name:  Tabatha Paris    :  1975  MRN: [id number]  Restrictions/Precautions:    Medical/Treatment Diagnosis Information:  · Diagnosis: s/p R ankle trimalleolar fracture / ORIF 20  · Treatment Diagnosis: M25.571, M25.674, N41.90  Insurance/Certification information:  PT Insurance Information: [place]  Physician Information:  Referring Practitioner: Mabel Coleman  Has the plan of care been signed (Y/N):        []  Yes  [x]  No     Date of Patient follow up with Physician: unknown    Is this a Progress Report:     []  Yes  [x]  No      If Yes:  Date Range for reporting period:  Initial Eval: 2020  Beginnin2020 --- Endin21    Progress report will be due (10 Rx or 30 days whichever is less):      Recertification will be due (POC Duration  / 90 days whichever is less): 3/29/21      Visit # Insurance Allowable Auth Required   In Person 2021 30 []  Yes     []  No    Tele Health 0  []  Yes     []  No    Total 7       Functional Scale: LEFS: 60% (Score: 32/80)   Date assessed: 2021      Latex Allergy:  [x]NO      []YES  Preferred Language for Healthcare:   [x]English       []other:    Pain level:  4/10     SUBJECTIVE:  Has been weaning into Los brace slowly. Today he tolerated 3 hours, then went to the boot for the remainder of the day. He is back in the brace when he comes back in. He has been having some heel pain, but going back into the boot makes that feel better.     OBJECTIVE:    Observation:    Test measurements:     21 DF 6 EV 7    RESTRICTIONS/PRECAUTIONS: WBAT in boot on 21 progress to Los brace as tolerated per MD note     Exercises/Interventions:   Therapeutic Ex (94084)  Therapeutic Activity (22141)  NMR re-education (77257) Sets/Reps Notes/CUES   Bike 6'                      HR / slantboard X30/ 3x30\" ea gastroc / soleus    Standing heel raises x20    Marches  x20    HS Curl x20    Mini Squat  x20    FSU  4\" x20    SLB 5\" x10    DAVID abduction 20x 45#    Attempt step up and over, but inc pain 1x Hold for now   Lateral step down 2\" 20x         Leg Press  100# x30 B / 80# x20 right only                                             Towel stretch Inv/ev 20\"/2x    BAPS (green ball) PF /DF and INV/EVR circles X20 / x20  x20 CW / x20 CCW         T-Band 4 way  At home                             Gait  2' Cues for step through, crutch training - tried a couple of steps without boot today with crutches - reports getting \"pinching feeling\" in front of ankle             Access code:[email or web address]               Manual Intervention (76260)     Moderate talocrural joint mob  Grade III 6' In sitting and in standing with movement   Light soft tissue for swelling management 5'    CFM for PF     Gentle ROM                                  Patient Education         Therapeutic Exercise and NMR EXR  [x] (98160) Provided verbal/tactile cueing for activities related to strengthening, flexibility, endurance, ROM for improvements in LE, proximal hip, and core control with self care, mobility, lifting, ambulation. [x] (36225) Provided verbal/tactile cueing for activities related to improving balance, coordination, kinesthetic sense, posture, motor skill, proprioception to assist with LE, proximal hip, and core control in self-care, mobility, lifting, ambulation and eccentric single leg control. NMR and Therapeutic Activities:    [x] (10697 or 62169) Provided verbal/tactile cueing for activities related to improving balance, coordination, kinesthetic sense, posture, motor skill, proprioception and motor activation to allow for proper function of core, proximal hip and LE with self-care and ADLs and functional mobility.    [x] (62582) Gait Re-education- Provided training and instruction to the patient for proper LE, core and proximal hip recruitment and positioning and eccentric body weight control with ambulation re-education including up and down stairs     Home Exercise Program:    [x] (74867) Reviewed/Progressed HEP activities related to strengthening, flexibility, endurance, ROM of core, proximal hip and LE for functional self-care, mobility, lifting and ambulation/stair navigation   [x] (74059) Reviewed/Progressed HEP activities related to improving balance, coordination, kinesthetic sense, posture, motor skill, proprioception of core, proximal hip and LE for self-care, mobility, lifting, and ambulation/stair navigation      Manual Treatments:  PROM / STM / Oscillations-Mobs:  G-I, II, III, IV (PA's, Inf., Post.)  [x] (19819) Provided manual therapy to mobilize LE, proximal hip and/or LS spine soft tissue/joints for the purpose of modulating pain, promoting relaxation, increasing ROM, reducing/eliminating soft tissue swelling/inflammation/restriction, improving soft tissue extensibility and allowing for proper ROM for normal function with self-care, mobility, lifting and ambulation. Modalities:    Charges:  Timed Code Treatment Minutes: 55   Total Treatment Minutes:  55   BWC:  TE TIME:  NMR TIME:  MANUAL TIME:  UNTIMED MINUTES:  Medicare Total:    n/a  n/a  n/a  n/a  n/a      [] EVAL (LOW) 78621 (typically 20 minutes face-to-face)  [] EVAL (MOD) 27785 (typically 30 minutes face-to-face)  [] EVAL (HIGH) 53292 (typically 45 minutes face-to-face)  [] RE-EVAL     [x] FN(05377) x  2   [] IONTO  [x] NMR (05689) x1     [] VASO  [x] Manual (69004) x 1    [] Other: gait  [] TA x      [] Mech Traction (57832)  [] ES(attended) (55929)      [] ES (un) (07586):    ASSESSMENT:  Patient tolerated doing well and demonstrating improved ROM. Patient demonstrates weakness with single leg activities and did not have enough DF ROM/control to perform up and over step. Will continue to progress as tolerated. GOALS:   Short Term Goals:  To be adjustment due to lack of progress  [] Patient is not progressing as expected and requires additional follow up with physician  [] Other    Prognosis for POC: [x] Good [] Fair  [] Poor    Patient requires continued skilled intervention: [x] Yes  [] No    Treatment/Activity Tolerance:  [x] Patient able to complete treatment  [] Patient limited by fatigue  [] Patient limited by pain    [] Patient limited by other medical complications  [] Other:     Return to Play: (if applicable)   []  Stage 1: Intro to Strength   []  Stage 2: Return to Run and Strength   []  Stage 3: Return to Jump and Strength   []  Stage 4: Dynamic Strength and Agility   []  Stage 5: Sport Specific Training     []  Ready to Return to Play, Meets All Above Stages   [x]  Not Ready for Return to Sports   Comments:                         PLAN:   [x] Continue per plan of care [] Alter current plan (see comments above)  [] Plan of care initiated [] Hold pending MD visit [] Discharge    Electronically signed by:  Jannet Hernandez PT    Note: If patient does not return for scheduled/ recommended follow up visits, this note will serve as a discharge from care along with most recent update on progress.

## 2021-01-26 ENCOUNTER — HOSPITAL ENCOUNTER (OUTPATIENT)
Dept: PHYSICAL THERAPY | Age: 46
Setting detail: THERAPIES SERIES
Discharge: HOME | End: 2021-01-26
Payer: COMMERCIAL

## 2021-01-26 PROCEDURE — 97112 NEUROMUSCULAR REEDUCATION: CPT | Performed by: PHYSICAL THERAPY ASSISTANT

## 2021-01-26 PROCEDURE — 97140 MANUAL THERAPY 1/> REGIONS: CPT | Performed by: PHYSICAL THERAPY ASSISTANT

## 2021-01-26 PROCEDURE — 97110 THERAPEUTIC EXERCISES: CPT | Performed by: PHYSICAL THERAPY ASSISTANT

## 2021-01-26 NOTE — FLOWSHEET NOTE
[facility] [address] [place] [address] [place], [address]    Physical Therapy Treatment Note/ Progress Report:     Date:  2021    Patient Name:  Adelaida Fabian    :  1975  MRN: [id number]  Restrictions/Precautions:    Medical/Treatment Diagnosis Information:  · Diagnosis: s/p R ankle trimalleolar fracture / ORIF 20  · Treatment Diagnosis: M25.571, M25.674, T26.42  Insurance/Certification information:  PT Insurance Information: [employer]  Physician Information:  Referring Practitioner: Soniya Francisco  Has the plan of care been signed (Y/N):        []  Yes  [x]  No     Date of Patient follow up with Physician: unknown    Is this a Progress Report:     []  Yes  [x]  No      If Yes:  Date Range for reporting period:  Initial Eval: 2020  Beginnin2020 --- Endin21    Progress report will be due (10 Rx or 30 days whichever is less): 3/09/09     Recertification will be due (POC Duration  / 90 days whichever is less): 3/29/21      Visit # Insurance Allowable Auth Required   In Person  - 2021 30 []  Yes     []  No    Tele Health 0  []  Yes     []  No    Total 7       Functional Scale: LEFS: 60% (Score: 32/80)   Date assessed: 2021      Latex Allergy:  [x]NO      []YES  Preferred Language for Healthcare:   [x]English       []other:    Pain level:  4/10     SUBJECTIVE: Wearing brace all the time and doing well.       OBJECTIVE:    Observation:    Test measurements:     21 DF 6 EV 7    RESTRICTIONS/PRECAUTIONS: WBAT in boot on 21 progress to Los brace as tolerated per MD note     Exercises/Interventions:   Therapeutic Ex (27945)  Therapeutic Activity (76120)  NMR re-education (44607) Sets/Reps Notes/CUES   Bike 6'                      HR / slantboard X30/ 3x30\" ea gastroc / soleus    Standing heel raises x20    Marches  x30    HS Curl / Domenic Riddles  30# x25 / 20# 20    Mini Squat  x20    FSU  6\" x20    SLB 5\" x10 airex   DAVID abduction 20x 45#    Attempt step up and over, but inc pain  Hold for now   Lateral step down 4\" 10x         Leg Press  100# x30 B / 80# x20 right only                                             Towel stretch Inv/ev 20\"/2x    BAPS (green ball) PF /DF and INV/EVR circles X20 / x20  x20 CW / x20 CCW         T-Band 4 way  At home                             Gait  2' Cues for step through, crutch training - tried a couple of steps without boot today with crutches - reports getting \"pinching feeling\" in front of ankle             Access code:[email or web address]               Manual Intervention ([phone])     Moderate talocrural joint mob  Grade III 6' In sitting and in standing with movement   Light soft tissue for swelling management 5'    CFM for PF     Gentle ROM                                  Patient Education         Therapeutic Exercise and NMR EXR  [x] (67808) Provided verbal/tactile cueing for activities related to strengthening, flexibility, endurance, ROM for improvements in LE, proximal hip, and core control with self care, mobility, lifting, ambulation. [x] (25344) Provided verbal/tactile cueing for activities related to improving balance, coordination, kinesthetic sense, posture, motor skill, proprioception to assist with LE, proximal hip, and core control in self-care, mobility, lifting, ambulation and eccentric single leg control. NMR and Therapeutic Activities:    [x] (79169 or 15211) Provided verbal/tactile cueing for activities related to improving balance, coordination, kinesthetic sense, posture, motor skill, proprioception and motor activation to allow for proper function of core, proximal hip and LE with self-care and ADLs and functional mobility.    [x] (59091) Gait Re-education- Provided training and instruction to the patient for proper LE, core and proximal hip recruitment and positioning and eccentric body weight control with ambulation re-education including up and down stairs     Home Exercise Program:    [x] (96419) Reviewed/Progressed HEP activities related to strengthening, flexibility, endurance, ROM of core, proximal hip and LE for functional self-care, mobility, lifting and ambulation/stair navigation   [x] (41529) Reviewed/Progressed HEP activities related to improving balance, coordination, kinesthetic sense, posture, motor skill, proprioception of core, proximal hip and LE for self-care, mobility, lifting, and ambulation/stair navigation      Manual Treatments:  PROM / STM / Oscillations-Mobs:  G-I, II, III, IV (PA's, Inf., Post.)  [x] (71326) Provided manual therapy to mobilize LE, proximal hip and/or LS spine soft tissue/joints for the purpose of modulating pain, promoting relaxation, increasing ROM, reducing/eliminating soft tissue swelling/inflammation/restriction, improving soft tissue extensibility and allowing for proper ROM for normal function with self-care, mobility, lifting and ambulation. Modalities:  Game ready x 10 min post-session  Charges:  Timed Code Treatment Minutes: 55   Total Treatment Minutes:  65   BWC:  TE TIME:  NMR TIME:  MANUAL TIME:  UNTIMED MINUTES:  Medicare Total:    n/a  n/a  n/a  n/a  n/a      [] EVAL (LOW) 11613 (typically 20 minutes face-to-face)  [] EVAL (MOD) 55984 (typically 30 minutes face-to-face)  [] EVAL (HIGH) 59329 (typically 45 minutes face-to-face)  [] RE-EVAL     [x] EI(12656) x  2   [] IONTO  [x] NMR (02826) x1     [] VASO  [x] Manual (61410) x 1    [] Other: gait  [] TA x      [] Mech Traction (27464)  [] ES(attended) (87823)      [] ES (un) (21469):    ASSESSMENT:  Patient tolerated doing well and demonstrating improved ROM. Patient demonstrates weakness with single leg activities and did not have enough DF ROM/control to perform up and over step. Will continue to progress as tolerated. GOALS:   Short Term Goals: To be achieved in: 2 weeks  1. Independent in HEP and progression per patient tolerance, in order to prevent re-injury. [x]? Progressing: []?  Met: []? Other    Prognosis for POC: [x] Good [] Fair  [] Poor    Patient requires continued skilled intervention: [x] Yes  [] No    Treatment/Activity Tolerance:  [x] Patient able to complete treatment  [] Patient limited by fatigue  [] Patient limited by pain    [] Patient limited by other medical complications  [] Other:     Return to Play: (if applicable)   []  Stage 1: Intro to Strength   []  Stage 2: Return to Run and Strength   []  Stage 3: Return to Jump and Strength   []  Stage 4: Dynamic Strength and Agility   []  Stage 5: Sport Specific Training     []  Ready to Return to Play, Meets All Above Stages   [x]  Not Ready for Return to Sports   Comments:                         PLAN:   [x] Continue per plan of care [] Alter current plan (see comments above)  [] Plan of care initiated [] Hold pending MD visit [] Discharge    Electronically signed by:  Sidney Trejo PTA    Note: If patient does not return for scheduled/ recommended follow up visits, this note will serve as a discharge from care along with most recent update on progress.

## 2021-01-29 ENCOUNTER — HOSPITAL ENCOUNTER (OUTPATIENT)
Dept: PHYSICAL THERAPY | Age: 46
Setting detail: THERAPIES SERIES
Discharge: HOME | End: 2021-01-29
Payer: COMMERCIAL

## 2021-01-29 PROCEDURE — 97140 MANUAL THERAPY 1/> REGIONS: CPT

## 2021-01-29 PROCEDURE — 97112 NEUROMUSCULAR REEDUCATION: CPT

## 2021-01-29 PROCEDURE — 97110 THERAPEUTIC EXERCISES: CPT

## 2021-01-29 NOTE — FLOWSHEET NOTE
[facility], [address] [place] [address] [address], [address]  Date: 2021          Patient Name; :  Loyd Vincent; 1975   Dx/ICD Code: s/p R ankle trimalleolar fx / ORIF 20       Physician: May Part        Total PT Visits: 8     Measures Previous Current   Pain (0-10) 6/10 4/10   Disability % 60% 44%   ROM R ankle PF 45 deg R ankle PF 50 deg    DF 0 deg DF 6 deg    In 15 deg, Ev 6 deg Inv 20 deg, Ev 7 deg   Strength NT 3-/5               Specific Functional Improvements & Impressions:  Patient demonstrates improvements in ROM and is now able to ambulate without boot or assistive device. Currently ambulating in Los brace and demonstrating improvements in balance, strength, and ROM weekly. Continues to demonstrate significant ROM limitations in DF, especially with weightbearing DF, making it tibial advancement during stance phase difficult. Requires skilled PT to address remaining gait, ROM, strength, and balance limitations. Plan & Recommendations:  [x] Continue rehabilitation due to objective improvement and continued functional deficits with frequency and duration: 2x/week for remainder of POC  [] Progress toward  []GAP, []Work Conditioning, []Independent HEP   [] Discharge due to   [] All goals achieved, [] Maximized \"medical necessity\" [] No subjective or objective improvements      Electronically signed by:  Wayne Garcia, PT  Therapy Plan of Care Re-Certification  This patient has been re-evaluated for physical therapy services and for therapy to continue, Medicare, Medicaid and other insurances require periodic physician review of the treatment plan.  Please review the above re-evaluation and verify that you agree with plan of care as established above by signing the attached document and return it to our office or note changes to established plan below  [] Follow treatment plan as above [] Discontinue physical therapy  [] Change plan to: __________________________________________________    Physician Signature:____________________________________ Date:____________  By signing above, therapists plan is approved by physician    If you have any questions or concerns, please don't hesitate to call. Thank you for your referral.    [facility] [address] office  ([phone])     Fax [phone]     Physical Therapy Treatment Note/ Progress Report:     Date:  2021    Patient Name:  Luba Lugo    :  1975  MRN: [id number]  Restrictions/Precautions:    Medical/Treatment Diagnosis Information:  · Diagnosis: s/p R ankle trimalleolar fracture / ORIF 20  · Treatment Diagnosis: M25.571, M25.674, A55.48  Insurance/Certification information:  PT Insurance Information: [employer]  Physician Information:  Referring Practitioner: Jorge Bustamante  Has the plan of care been signed (Y/N):        []  Yes  [x]  No     Date of Patient follow up with Physician: unknown    Is this a Progress Report:     []  Yes  [x]  No      If Yes:  Date Range for reporting period:  Initial Eval: 2020  Beginnin2020 --- Endin21    Progress report will be due (10 Rx or 30 days whichever is less):      Recertification will be due (POC Duration  / 90 days whichever is less): 3/29/21      Visit # Insurance Allowable Auth Required   In Person 2021 30 []  Yes     []  No    Tele Health 0  []  Yes     []  No    Total 8       Functional Scale: LEFS: 44% (Score: 46/80)   Date assessed: 2021      Latex Allergy:  [x]NO      []YES  Preferred Language for Healthcare:   [x]English       []other:    Pain level:  4/10     SUBJECTIVE: Patient reports he is doing well with the brace without the crutches, although he did use the crutches one time this week to be extra cautious with the ice. He reports he was a little more sore after Tuesday and he thinks it's because he used some muscles he hasn't used in a while.       OBJECTIVE:    Observation:  Test measurements:  DF next to wall: Toe distance L: 5 in, R: 0 in (toe touching wall)    1/29/21 DF 6 EV 7    RESTRICTIONS/PRECAUTIONS: WBAT in boot on 1/18/21 progress to Los brace as tolerated per MD note     Exercises/Interventions:   Therapeutic Ex (33488)  Therapeutic Activity (56348)  NMR re-education (52931) Sets/Reps Notes/CUES   Bike 6'                      HR / slantboard X30/ 3x30\" ea gastroc / soleus    Standing heel raises x20    Marches  x30    HS Curl / Edmundo Macleod  30# x25 / 20# 20    Mini Squat  x20    FSU  6\" x20    SLB 5\" x10 airex   DAVID abduction 20x 45#    Attempt step up and over, but inc pain     Lateral step down 4\" 10x         Leg Press  80# x30 B / 60# x20 right only Dropped weight a bit - patient request since he's more sore today                                            Towel stretch Inv/ev 20\"/2x    BAPS (green ball) PF /DF and INV/EVR circles X20 / x20  x20 CW / x20 CCW         T-Band 4 way  At home                             Gait  2' Cues for step through, crutch training - tried a couple of steps without boot today with crutches - reports getting \"pinching feeling\" in front of ankle             Access code:[email or web address]               Manual Intervention (70756)     Moderate talocrural joint mob  Grade III 6' In sitting and in standing with movement   Light soft tissue for swelling management 5'    CFM for PF     Gentle ROM                                  Patient Education         Therapeutic Exercise and NMR EXR  [x] (16411) Provided verbal/tactile cueing for activities related to strengthening, flexibility, endurance, ROM for improvements in LE, proximal hip, and core control with self care, mobility, lifting, ambulation.   [x] (13991) Provided verbal/tactile cueing for activities related to improving balance, coordination, kinesthetic sense, posture, motor skill, proprioception to assist with LE, proximal hip, and core control in self-care, mobility, lifting, ambulation and eccentric single leg control. NMR and Therapeutic Activities:    [x] (71870 or 75500) Provided verbal/tactile cueing for activities related to improving balance, coordination, kinesthetic sense, posture, motor skill, proprioception and motor activation to allow for proper function of core, proximal hip and LE with self-care and ADLs and functional mobility. [x] (64183) Gait Re-education- Provided training and instruction to the patient for proper LE, core and proximal hip recruitment and positioning and eccentric body weight control with ambulation re-education including up and down stairs     Home Exercise Program:    [x] (39759) Reviewed/Progressed HEP activities related to strengthening, flexibility, endurance, ROM of core, proximal hip and LE for functional self-care, mobility, lifting and ambulation/stair navigation   [x] (05414) Reviewed/Progressed HEP activities related to improving balance, coordination, kinesthetic sense, posture, motor skill, proprioception of core, proximal hip and LE for self-care, mobility, lifting, and ambulation/stair navigation      Manual Treatments:  PROM / STM / Oscillations-Mobs:  G-I, II, III, IV (PA's, Inf., Post.)  [x] (24945) Provided manual therapy to mobilize LE, proximal hip and/or LS spine soft tissue/joints for the purpose of modulating pain, promoting relaxation, increasing ROM, reducing/eliminating soft tissue swelling/inflammation/restriction, improving soft tissue extensibility and allowing for proper ROM for normal function with self-care, mobility, lifting and ambulation.      Modalities:   Charges:  Timed Code Treatment Minutes: 55   Total Treatment Minutes:  55   BWC:  TE TIME:  NMR TIME:  MANUAL TIME:  UNTIMED MINUTES:  Medicare Total:    n/a  n/a  n/a  n/a  n/a      [] EVAL (LOW) 83809 (typically 20 minutes face-to-face)  [] EVAL (MOD) 19443 (typically 30 minutes face-to-face)  [] EVAL (HIGH) 90843 (typically 45 minutes face-to-face)  [] RE-EVAL     [x] KX(98982) x  2   [] IONTO  [x] NMR (29541) x1     [] VASO  [x] Manual (23724) x 1    [] Other: gait  [] TA x      [] Mech Traction (63806)  [] ES(attended) (76764)      [] ES (un) (47838):    ASSESSMENT:  See PN    GOALS:   Short Term Goals: To be achieved in: 2 weeks  1. Independent in HEP and progression per patient tolerance, in order to prevent re-injury. [x]? Progressing: []? Met: []? Not Met: []? Adjusted       2. Patient will have a decrease in pain to facilitate improvement in movement, function, and ADLs as indicated by Functional Deficits. [x]? Progressing: []? Met: []? Not Met: []? Adjusted          Long Term Goals: To be achieved in: 12 weeks  1. Disability index score of 20% or less for the LEFS to assist with reaching prior level of function. [x]? Progressing: []? Met: []? Not Met: []? Adjusted      2. Patient will demonstrate increased AROM to equal the opposite side bilaterally to allow for proper joint functioning as indicated by patients Functional Deficits. [x]? Progressing: []? Met: []? Not Met: []? Adjusted       3. Patient will demonstrate an increase in strength to 5/5 to allow for proper functional mobility as indicated by patients Functional Deficits. [x]? Progressing: []? Met: []? Not Met: []? Adjusted       4. Patient will return to all transfers, work activities, and functional activities without increased symptoms or restriction. [x]? Progressing: []? Met: []? Not Met: []? Adjusted       5. Patient will have 0/10 pain with ADL's. [x]? Progressing: []? Met: []? Not Met: []? Adjusted       6. Patient stated goal: Patient wants to be able to walk and hike on uneven ground without AD x 30 minutes without pain or restriction to be able to return to prior level of function. [x]? Progressing: []? Met: []? Not Met: []?  Adjusted                    (Cut /Paste from eval)    Overall Progression Towards Functional goals/ Treatment Progress Update:  [x] Patient is progressing as expected towards functional goals listed. [] Progression is slowed due to complexities/Impairments listed. [] Progression has been slowed due to co-morbidities. [] Plan just implemented, too soon to assess goals progression <30days   [] Goals require adjustment due to lack of progress  [] Patient is not progressing as expected and requires additional follow up with physician  [] Other    Prognosis for POC: [x] Good [] Fair  [] Poor    Patient requires continued skilled intervention: [x] Yes  [] No    Treatment/Activity Tolerance:  [x] Patient able to complete treatment  [] Patient limited by fatigue  [] Patient limited by pain    [] Patient limited by other medical complications  [] Other:     Return to Play: (if applicable)   []  Stage 1: Intro to Strength   []  Stage 2: Return to Run and Strength   []  Stage 3: Return to Jump and Strength   []  Stage 4: Dynamic Strength and Agility   []  Stage 5: Sport Specific Training     []  Ready to Return to Play, Meets All Above Stages   [x]  Not Ready for Return to Sports   Comments:                         PLAN:   [] Continue per plan of care [x] Alter current plan (see comments above)  [] Plan of care initiated [] Hold pending MD visit [] Discharge    Electronically signed by:  Nazario Garcia PT    Note: If patient does not return for scheduled/ recommended follow up visits, this note will serve as a discharge from care along with most recent update on progress.

## 2021-02-02 ENCOUNTER — HOSPITAL ENCOUNTER (OUTPATIENT)
Dept: PHYSICAL THERAPY | Age: 46
Setting detail: THERAPIES SERIES
Discharge: HOME | End: 2021-02-02
Payer: COMMERCIAL

## 2021-02-02 PROCEDURE — 97110 THERAPEUTIC EXERCISES: CPT | Performed by: PHYSICAL THERAPY ASSISTANT

## 2021-02-02 PROCEDURE — 97112 NEUROMUSCULAR REEDUCATION: CPT | Performed by: PHYSICAL THERAPY ASSISTANT

## 2021-02-02 PROCEDURE — 97140 MANUAL THERAPY 1/> REGIONS: CPT | Performed by: PHYSICAL THERAPY ASSISTANT

## 2021-02-02 NOTE — FLOWSHEET NOTE
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.25  Insurance/Certification information:  PT Insurance Information: Jyothi  Physician Information:  Referring Practitioner: Rad Valencia  Has the plan of care been signed (Y/N):        []  Yes  [x]  No     Date of Patient follow up with Physician: unknown    Is this a Progress Report:     []  Yes  [x]  No      If Yes:  Date Range for reporting period:  Initial Eval: 2020  Beginnin2020 --- Endin21    Progress report will be due (10 Rx or 30 days whichever is less):      Recertification will be due (POC Duration  / 90 days whichever is less): 3/29/21      Visit # Insurance Allowable Auth Required   In Person 2021 30 []  Yes     []  No    [facility] 0  []  Yes     []  No    Total 9       Functional Scale: LEFS: 44% (Score: 46/80)   Date assessed: 2021    Latex Allergy:  [x]NO      []YES  Preferred Language for Healthcare:   [x]English       []other:    Pain level:  4/10     SUBJECTIVE: Has been doing well. Wearing brace. Swelling has been good. Wears brace 2-3 hours in the morning, then to boot for a few hours and then back to brace towards the end of the day. At home doing well out of boot. Still feels stiff across the anterior join line when out of boot and brace at home. Standing with equal weightbearing is now painfree. OBJECTIVE:    Observation:    Test measurements:  DF next to wall:  Toe distance L: 5 in, R: 0 in (toe touching wall)    21 DF 6 EV 7    RESTRICTIONS/PRECAUTIONS: WBAT in boot on 21 progress to Los brace as tolerated per MD note     Exercises/Interventions:   Therapeutic Ex (10527)  Therapeutic Activity (49283)  NMR re-education (63190) Sets/Reps Notes/CUES   Bike 6' L5                     HR / slantboard X30/ 3x30\" ea gastroc / soleus         Marches  x20    HS Curl / Shree Shena  30# x25 / 20# 20    Mini Squat  x20 airex   FSU  6\" x20    SLB 5\" x10 airex   DAVID abduction 20x 45#    Attempt step up and over, but inc pain     Lateral step down 4\" 20x         Leg Press  80# x30 B / 60# x20 right only Dropped weight a bit - patient request since he's more sore today                                            Towel stretch Inv/ev 20\"/2x    BAPS (green ball) PF /DF and INV/EVR circles X20 / x20  x20 CW / x20 CCW         T-Band 4 way  At home           Sitting on SB with rolling mobs 4 ways on black foam wedge with manual assist  10'                 Gait  2' Cues for step through, crutch training - tried a couple of steps without boot today with crutches - reports getting \"pinching feeling\" in front of ankle             Access code:[email or web address]               Manual Intervention (43117)     Moderate talocrural joint mob  Grade III 6' In sitting and in standing with movement   Light soft tissue for swelling management 5'    CFM for PF     Gentle ROM                                  Patient Education         Therapeutic Exercise and NMR EXR  [x] (17326) Provided verbal/tactile cueing for activities related to strengthening, flexibility, endurance, ROM for improvements in LE, proximal hip, and core control with self care, mobility, lifting, ambulation. [x] (80058) Provided verbal/tactile cueing for activities related to improving balance, coordination, kinesthetic sense, posture, motor skill, proprioception to assist with LE, proximal hip, and core control in self-care, mobility, lifting, ambulation and eccentric single leg control.      NMR and Therapeutic Activities:    [x] (39797 or 39045) Provided verbal/tactile cueing for activities related to improving balance, coordination, kinesthetic sense, posture, motor skill, proprioception and motor activation to allow for proper function of core, proximal hip and LE with self-care and ADLs and functional mobility. [x] (82650) Gait Re-education- Provided training and instruction to the patient for proper LE, core and proximal hip recruitment and positioning and eccentric body weight control with ambulation re-education including up and down stairs     Home Exercise Program:    [x] (05622) Reviewed/Progressed HEP activities related to strengthening, flexibility, endurance, ROM of core, proximal hip and LE for functional self-care, mobility, lifting and ambulation/stair navigation   [x] (49991) Reviewed/Progressed HEP activities related to improving balance, coordination, kinesthetic sense, posture, motor skill, proprioception of core, proximal hip and LE for self-care, mobility, lifting, and ambulation/stair navigation      Manual Treatments:  PROM / STM / Oscillations-Mobs:  G-I, II, III, IV (PA's, Inf., Post.)  [x] (89366) Provided manual therapy to mobilize LE, proximal hip and/or LS spine soft tissue/joints for the purpose of modulating pain, promoting relaxation, increasing ROM, reducing/eliminating soft tissue swelling/inflammation/restriction, improving soft tissue extensibility and allowing for proper ROM for normal function with self-care, mobility, lifting and ambulation.      Modalities:   Charges:  Timed Code Treatment Minutes: 55   Total Treatment Minutes:  55   BWC:  TE TIME:  NMR TIME:  MANUAL TIME:  UNTIMED MINUTES:  Medicare Total:    n/a  n/a  n/a  n/a  n/a      [] EVAL (LOW) 87602 (typically 20 minutes face-to-face)  [] EVAL (MOD) 35763 (typically 30 minutes face-to-face)  [] EVAL (HIGH) 43898 (typically 45 minutes face-to-face)  [] RE-EVAL     [x] XP(07355) x  2   [] IONTO  [x] NMR (02225) x1     [] VASO  [x] Manual (33342) x 1    [] Other: gait  [] TA x      [] [facility] Traction (94464)  [] ES(attended) (12741)      [] ES (un) (16538):    ASSESSMENT:  See PN    GOALS:   Short Term Goals: To be achieved in: 2 weeks  1. Independent in HEP and progression per patient tolerance, in order to prevent re-injury. [x]? Progressing: []? Met: []? Not Met: []? Adjusted       2. Patient will have a decrease in pain to facilitate improvement in movement, function, and ADLs as indicated by Functional Deficits. [x]? Progressing: []? Met: []? Not Met: []? Adjusted          Long Term Goals: To be achieved in: 12 weeks  1. Disability index score of 20% or less for the LEFS to assist with reaching prior level of function. [x]? Progressing: []? Met: []? Not Met: []? Adjusted      2. Patient will demonstrate increased AROM to equal the opposite side bilaterally to allow for proper joint functioning as indicated by patients Functional Deficits. [x]? Progressing: []? Met: []? Not Met: []? Adjusted       3. Patient will demonstrate an increase in strength to 5/5 to allow for proper functional mobility as indicated by patients Functional Deficits. [x]? Progressing: []? Met: []? Not Met: []? Adjusted       4. Patient will return to all transfers, work activities, and functional activities without increased symptoms or restriction. [x]? Progressing: []? Met: []? Not Met: []? Adjusted       5. Patient will have 0/10 pain with ADL's. [x]? Progressing: []? Met: []? Not Met: []? Adjusted       6. Patient stated goal: Patient wants to be able to walk and hike on uneven ground without AD x 30 minutes without pain or restriction to be able to return to prior level of function. [x]? Progressing: []? Met: []? Not Met: []? Adjusted                    (Cut /Paste from eval)    Overall Progression Towards Functional goals/ Treatment Progress Update:  [x] Patient is progressing as expected towards functional goals listed.     [] Progression is slowed due to complexities/Impairments

## 2021-02-09 ENCOUNTER — HOSPITAL ENCOUNTER (OUTPATIENT)
Dept: PHYSICAL THERAPY | Age: 46
Setting detail: THERAPIES SERIES
Discharge: HOME | End: 2021-02-09
Payer: COMMERCIAL

## 2021-02-09 PROCEDURE — 97140 MANUAL THERAPY 1/> REGIONS: CPT | Performed by: PHYSICAL THERAPY ASSISTANT

## 2021-02-09 PROCEDURE — 97110 THERAPEUTIC EXERCISES: CPT | Performed by: PHYSICAL THERAPY ASSISTANT

## 2021-02-09 PROCEDURE — 97112 NEUROMUSCULAR REEDUCATION: CPT | Performed by: PHYSICAL THERAPY ASSISTANT

## 2021-02-09 NOTE — FLOWSHEET NOTE
[facility] [address] [place], [place] [address]  ([phone])     Fax [phone]     Physical Therapy Treatment Note/ Progress Report:     Date:  2021    Patient Name:  Dionna Hyatt    :  1975  MRN: [id number]  Restrictions/Precautions:    Medical/Treatment Diagnosis Information:  · Diagnosis: s/p R ankle trimalleolar fracture / ORIF 20  · Treatment Diagnosis: M25.571, M25.674, T80.12  Insurance/Certification information:  PT Insurance Information: Jyothi  Physician Information:  Referring Practitioner: Nahomy Best  Has the plan of care been signed (Y/N):        []  Yes  [x]  No     Date of Patient follow up with Physician: unknown    Is this a Progress Report:     []  Yes  [x]  No      If Yes:  Date Range for reporting period:  Initial Eval: 2020  Beginnin2020 --- Endin21    Progress report will be due (10 Rx or 30 days whichever is less): 3/62/97     Recertification will be due (POC Duration  / 90 days whichever is less): 3/29/21      Visit # Insurance Allowable Auth Required   In Person  - 2021 30 []  Yes     []  No    [facility] 0  []  Yes     []  No    Total 9       Functional Scale: LEFS: 44% (Score: 46/80)   Date assessed: 2021    Latex Allergy:  [x]NO      []YES  Preferred Language for Healthcare:   [x]English       []other:    Pain level:  4/10     SUBJECTIVE: MD pleased with progress. OK to be out of brace at home and just wear the brace when outside. OBJECTIVE:    Observation:    Test measurements:  DF next to wall:  Toe distance L: 5 in, R: 0 in (toe touching wall)    21 DF 6 EV 7    RESTRICTIONS/PRECAUTIONS: WBAT in boot on 21 progress to Los brace as tolerated per MD note     Exercises/Interventions:   Therapeutic Ex (33434)  Therapeutic Activity (32864)  NMR re-education (68333) Sets/Reps Notes/CUES   Bike 6' L5                     HR / slantboard X30/ 3x30\" Niharika Syed / soleus         Marches  x20    HS Curl / Michae Stacks  30# x25 / 20# 20    Mini Squat  x20 airex   Tandem squat  10x10\" ea     FSU  6\" x20    SLB 5\" x10 airex   DAVID abduction 20x 45#    Attempt step up and over, but inc pain     Lateral step down 4\" 20x         Step forward and hold  5\" x10         HR 4 walls with T-Band  Green x20 ea          Leg Press  80# x30 B / 60# x20 right only Dropped weight a bit - patient request since he's more sore today                                            Towel stretch Inv/ev 20\"/2x    BAPS (green ball) PF /DF and INV/EVR circles X20 / x20  x20 CW / x20 CCW         T-Band 4 way  X20 ea redAt home           Sitting on SB with rolling mobs 4 ways on black foam wedge with manual assist  10'                 Gait  2' Cues for step through, crutch training - tried a couple of steps without boot today with crutches - reports getting \"pinching feeling\" in front of ankle             Access code:[email or web address]               Manual Intervention (59874)     Moderate talocrural joint mob  Grade III 6' In sitting and in standing with movement   Light soft tissue for swelling management 5'    CFM for PF     Gentle ROM                                  Patient Education         Therapeutic Exercise and NMR EXR  [x] (57476) Provided verbal/tactile cueing for activities related to strengthening, flexibility, endurance, ROM for improvements in LE, proximal hip, and core control with self care, mobility, lifting, ambulation. [x] (14879) Provided verbal/tactile cueing for activities related to improving balance, coordination, kinesthetic sense, posture, motor skill, proprioception to assist with LE, proximal hip, and core control in self-care, mobility, lifting, ambulation and eccentric single leg control.      NMR and Therapeutic Activities:    [x] (23366 or 02141) Provided verbal/tactile cueing for activities related to improving balance, coordination, kinesthetic sense, posture, motor skill, proprioception and motor activation to allow for proper function of core, proximal hip and LE with self-care and ADLs and functional mobility. [x] (86540) Gait Re-education- Provided training and instruction to the patient for proper LE, core and proximal hip recruitment and positioning and eccentric body weight control with ambulation re-education including up and down stairs     Home Exercise Program:    [x] (42807) Reviewed/Progressed HEP activities related to strengthening, flexibility, endurance, ROM of core, proximal hip and LE for functional self-care, mobility, lifting and ambulation/stair navigation   [x] (34435) Reviewed/Progressed HEP activities related to improving balance, coordination, kinesthetic sense, posture, motor skill, proprioception of core, proximal hip and LE for self-care, mobility, lifting, and ambulation/stair navigation      Manual Treatments:  PROM / STM / Oscillations-Mobs:  G-I, II, III, IV (PA's, Inf., Post.)  [x] (14770) Provided manual therapy to mobilize LE, proximal hip and/or LS spine soft tissue/joints for the purpose of modulating pain, promoting relaxation, increasing ROM, reducing/eliminating soft tissue swelling/inflammation/restriction, improving soft tissue extensibility and allowing for proper ROM for normal function with self-care, mobility, lifting and ambulation.      Modalities:   Charges:  Timed Code Treatment Minutes: 63   Total Treatment Minutes:  63   BWC:  TE TIME:  NMR TIME:  MANUAL TIME:  UNTIMED MINUTES:  Medicare Total:    n/a  n/a  n/a  n/a  n/a      [] EVAL (LOW) 01839 (typically 20 minutes face-to-face)  [] EVAL (MOD) 48767 (typically 30 minutes face-to-face)  [] EVAL (HIGH) 10562 (typically 45 minutes face-to-face)  [] RE-EVAL     [x] BI(39859) x  2   [] IONTO  [x] NMR (45293) x1     [] VASO  [x] Manual (67104) x 1    [] Other: gait  [] TA x      [] Mech Traction (38971)  [] ES(attended) (66635)      [] ES (un) (83473):    ASSESSMENT:  See PN    GOALS:   Short Term Goals: To be achieved in: 2 weeks  1. Independent in HEP and progression per patient tolerance, in order to prevent re-injury. [x]? Progressing: []? Met: []? Not Met: []? Adjusted       2. Patient will have a decrease in pain to facilitate improvement in movement, function, and ADLs as indicated by Functional Deficits. [x]? Progressing: []? Met: []? Not Met: []? Adjusted          Long Term Goals: To be achieved in: 12 weeks  1. Disability index score of 20% or less for the LEFS to assist with reaching prior level of function. [x]? Progressing: []? Met: []? Not Met: []? Adjusted      2. Patient will demonstrate increased AROM to equal the opposite side bilaterally to allow for proper joint functioning as indicated by patients Functional Deficits. [x]? Progressing: []? Met: []? Not Met: []? Adjusted       3. Patient will demonstrate an increase in strength to 5/5 to allow for proper functional mobility as indicated by patients Functional Deficits. [x]? Progressing: []? Met: []? Not Met: []? Adjusted       4. Patient will return to all transfers, work activities, and functional activities without increased symptoms or restriction. [x]? Progressing: []? Met: []? Not Met: []? Adjusted       5. Patient will have 0/10 pain with ADL's. [x]? Progressing: []? Met: []? Not Met: []? Adjusted       6. Patient stated goal: Patient wants to be able to walk and hike on uneven ground without AD x 30 minutes without pain or restriction to be able to return to prior level of function. [x]? Progressing: []? Met: []? Not Met: []? Adjusted                    (Cut /Paste from eval)    Overall Progression Towards Functional goals/ Treatment Progress Update:  [x] Patient is progressing as expected towards functional goals listed. [] Progression is slowed due to complexities/Impairments listed. [] Progression has been slowed due to co-morbidities.   [] Plan just implemented, too soon to assess goals progression <30days   [] Goals require adjustment due to lack of progress  [] Patient is not progressing as expected and requires additional follow up with physician  [] Other    Prognosis for POC: [x] Good [] Fair  [] Poor    Patient requires continued skilled intervention: [x] Yes  [] No    Treatment/Activity Tolerance:  [x] Patient able to complete treatment  [] Patient limited by fatigue  [] Patient limited by pain    [] Patient limited by other medical complications  [] Other:     Return to Play: (if applicable)   []  Stage 1: Intro to Strength   []  Stage 2: Return to Run and Strength   []  Stage 3: Return to Jump and Strength   []  Stage 4: Dynamic Strength and Agility   []  Stage 5: Sport Specific Training     []  Ready to Return to Play, Meets All Above Stages   [x]  Not Ready for Return to Sports   Comments:                         PLAN:   [x] Continue per plan of care [] Alter current plan (see comments above)  [] Plan of care initiated [] Hold pending MD visit [] Discharge    Electronically signed by:  Vasquez Phan PTA    Note: If patient does not return for scheduled/ recommended follow up visits, this note will serve as a discharge from care along with most recent update on progress.

## 2021-02-12 ENCOUNTER — HOSPITAL ENCOUNTER (OUTPATIENT)
Dept: PHYSICAL THERAPY | Age: 46
Setting detail: THERAPIES SERIES
Discharge: HOME | End: 2021-02-12
Payer: COMMERCIAL

## 2021-02-12 ENCOUNTER — APPOINTMENT (OUTPATIENT)
Dept: PHYSICAL THERAPY | Age: 46
End: 2021-02-12
Payer: COMMERCIAL

## 2021-02-12 PROCEDURE — 97110 THERAPEUTIC EXERCISES: CPT

## 2021-02-12 PROCEDURE — 97112 NEUROMUSCULAR REEDUCATION: CPT

## 2021-02-12 PROCEDURE — 97140 MANUAL THERAPY 1/> REGIONS: CPT

## 2021-02-12 NOTE — FLOWSHEET NOTE
[facility], [address], [facility] Therapy office  ([phone])     Fax [phone]     Physical Therapy Treatment Note/ Progress Report:     Date:  2021    Patient Name:  Kathleen Adrian    :  1975  MRN: [id number]  Restrictions/Precautions:    Medical/Treatment Diagnosis Information:  · Diagnosis: s/p R ankle trimalleolar fracture / ORIF 20  · Treatment Diagnosis: M25.571, M25.674, G17.19  Insurance/Certification information:  PT Insurance Information: Jyothi  Physician Information:  Referring Practitioner: Geno Bingham  Has the plan of care been signed (Y/N):        []  Yes  [x]  No     Date of Patient follow up with Physician: unknown    Is this a Progress Report:     []  Yes  [x]  No      If Yes:  Date Range for reporting period:  Initial Eval: 2020  Beginnin2020 --- Endin21  Beginnin21 -- Endin21    Progress report will be due (10 Rx or 30 days whichever is less):      Recertification will be due (POC Duration  / 90 days whichever is less): 3/29/21      Visit # Insurance Allowable Auth Required   In Person 10 - 2021  1 - 2020 30 []  Yes     []  No    [facility] 0  []  Yes     []  No    Total 9       Functional Scale: LEFS: 44% (Score: 46/80)   Date assessed: 2021    Latex Allergy:  [x]NO      []YES  Preferred Language for Healthcare:   [x]English       []other:    Pain level:  3/10     SUBJECTIVE: Patient reports he is doing well and continues to be able to tolerate more walking at school. He is wearing the brace when out and about, especially with all the ice right now. He has still been taking the elevator to go downstairs at school, mostly because it is so much faster. OBJECTIVE:    Observation:    Test measurements:  DF next to wall:  Toe distance L: 5 in, R: 1 in 21 DF 6 EV 7    RESTRICTIONS/PRECAUTIONS: WBAT in boot on 21 progress to Los brace as tolerated per MD note     Exercises/Interventions:   Therapeutic Ex (00172)  Therapeutic Activity (67128)  NMR re-education (93874) Sets/Reps Notes/CUES   Bike 6' L5                     HR / slantboard X30/ 3x30\" ea gastroc / soleus         Marches  x20    HS Curl / [employer]  30# x25 / 20# 20    Mini Squat  x20 airex   Tandem squat  10x10\" ea     FSU  6\" x20    SLB 5\" x10 airex   DAVID abduction 20x 45#    Attempt step up and over, but inc pain     Lateral step down 4\" 20x         Step forward and hold  5\" x10         HR 4 walls with T-Band  Green x20 ea          Leg Press  100# x30 B / 80# x20 right only    Side steps with band 10 reps    Bridges with SB 5x5                                   Towel stretch Inv/ev    BAPS (green ball) PF /DF and INV/EVR circles X20 / x20  x20 CW / x20 CCW         T-Band 4 way  At home           Sitting on SB with rolling mobs 4 ways on black foam wedge with manual assist                   Gait                Access code:[email or web address]               Manual Intervention (10365)     Moderate talocrural joint mob  Grade III 6' In sitting and in standing with movement   Light soft tissue for swelling management 5'    CFM for PF     Gentle ROM                                  Patient Education         Therapeutic Exercise and NMR EXR  [x] (53568) Provided verbal/tactile cueing for activities related to strengthening, flexibility, endurance, ROM for improvements in LE, proximal hip, and core control with self care, mobility, lifting, ambulation. [x] (75215) Provided verbal/tactile cueing for activities related to improving balance, coordination, kinesthetic sense, posture, motor skill, proprioception to assist with LE, proximal hip, and core control in self-care, mobility, lifting, ambulation and eccentric single leg control.      NMR and Therapeutic Activities:    [x] (33345 or 31703) Provided verbal/tactile cueing for activities related to improving balance, coordination, kinesthetic sense, posture, motor skill, proprioception and motor activation to allow for proper function of core, proximal hip and LE with self-care and ADLs and functional mobility. [x] (66521) Gait Re-education- Provided training and instruction to the patient for proper LE, core and proximal hip recruitment and positioning and eccentric body weight control with ambulation re-education including up and down stairs     Home Exercise Program:    [x] (91714) Reviewed/Progressed HEP activities related to strengthening, flexibility, endurance, ROM of core, proximal hip and LE for functional self-care, mobility, lifting and ambulation/stair navigation   [x] (48826) Reviewed/Progressed HEP activities related to improving balance, coordination, kinesthetic sense, posture, motor skill, proprioception of core, proximal hip and LE for self-care, mobility, lifting, and ambulation/stair navigation      Manual Treatments:  PROM / STM / Oscillations-Mobs:  G-I, II, III, IV (PA's, Inf., Post.)  [x] (29730) Provided manual therapy to mobilize LE, proximal hip and/or LS spine soft tissue/joints for the purpose of modulating pain, promoting relaxation, increasing ROM, reducing/eliminating soft tissue swelling/inflammation/restriction, improving soft tissue extensibility and allowing for proper ROM for normal function with self-care, mobility, lifting and ambulation.      Modalities:   Charges:  Timed Code Treatment Minutes: 60   Total Treatment Minutes:  60   BWC:  TE TIME:  NMR TIME:  MANUAL TIME:  UNTIMED MINUTES:  Medicare Total:    n/a  n/a  n/a  n/a  n/a      [] EVAL (LOW) 33971 (typically 20 minutes face-to-face)  [] EVAL (MOD) 86642 (typically 30 minutes face-to-face)  [] EVAL (HIGH) 18295 (typically 45 minutes face-to-face)  [] RE-EVAL     [x] JN(41444) x  2   [] IONTO  [x] NMR (46214) x1     [] VASO  [x] Manual (16148) x 1    [] Other: gait  [] TA x      [] Mech Traction (08114)  [] ES(attended) (77600)      [] ES (un) (19515):    ASSESSMENT:  Patient continues to demonstrate improvements in ROM and is tolerating all strengthening and neuromuscular re-ed well. GOALS:   Short Term Goals: To be achieved in: 2 weeks  1. Independent in HEP and progression per patient tolerance, in order to prevent re-injury. [x]? Progressing: []? Met: []? Not Met: []? Adjusted       2. Patient will have a decrease in pain to facilitate improvement in movement, function, and ADLs as indicated by Functional Deficits. [x]? Progressing: []? Met: []? Not Met: []? Adjusted          Long Term Goals: To be achieved in: 12 weeks  1. Disability index score of 20% or less for the LEFS to assist with reaching prior level of function. [x]? Progressing: []? Met: []? Not Met: []? Adjusted      2. Patient will demonstrate increased AROM to equal the opposite side bilaterally to allow for proper joint functioning as indicated by patients Functional Deficits. [x]? Progressing: []? Met: []? Not Met: []? Adjusted       3. Patient will demonstrate an increase in strength to 5/5 to allow for proper functional mobility as indicated by patients Functional Deficits. [x]? Progressing: []? Met: []? Not Met: []? Adjusted       4. Patient will return to all transfers, work activities, and functional activities without increased symptoms or restriction. [x]? Progressing: []? Met: []? Not Met: []? Adjusted       5. Patient will have 0/10 pain with ADL's. [x]? Progressing: []? Met: []? Not Met: []? Adjusted       6. Patient stated goal: Patient wants to be able to walk and hike on uneven ground without AD x 30 minutes without pain or restriction to be able to return to prior level of function. [x]? Progressing: []? Met: []? Not Met: []? Adjusted                    (Cut /Paste from eval)    Overall Progression Towards Functional goals/ Treatment Progress Update:  [x] Patient is progressing as expected towards functional goals listed.     [] Progression is slowed due to complexities/Impairments listed. [] Progression has been slowed due to co-morbidities. [] Plan just implemented, too soon to assess goals progression <30days   [] Goals require adjustment due to lack of progress  [] Patient is not progressing as expected and requires additional follow up with physician  [] Other    Prognosis for POC: [x] Good [] Fair  [] Poor    Patient requires continued skilled intervention: [x] Yes  [] No    Treatment/Activity Tolerance:  [x] Patient able to complete treatment  [] Patient limited by fatigue  [] Patient limited by pain    [] Patient limited by other medical complications  [] Other:     Return to Play: (if applicable)   []  Stage 1: Intro to Strength   []  Stage 2: Return to Run and Strength   []  Stage 3: Return to Jump and Strength   []  Stage 4: Dynamic Strength and Agility   []  Stage 5: Sport Specific Training     []  Ready to Return to Play, Meets All Above Stages   [x]  Not Ready for Return to Sports   Comments:                         PLAN:   [x] Continue per plan of care [] Alter current plan (see comments above)  [] Plan of care initiated [] Hold pending MD visit [] Discharge    Electronically signed by:  Jannet Hernandez PT    Note: If patient does not return for scheduled/ recommended follow up visits, this note will serve as a discharge from care along with most recent update on progress.

## 2021-02-16 ENCOUNTER — HOSPITAL ENCOUNTER (OUTPATIENT)
Dept: PHYSICAL THERAPY | Age: 46
Setting detail: THERAPIES SERIES
Discharge: HOME | End: 2021-02-16
Payer: COMMERCIAL

## 2021-02-16 PROCEDURE — 97140 MANUAL THERAPY 1/> REGIONS: CPT | Performed by: PHYSICAL THERAPY ASSISTANT

## 2021-02-16 PROCEDURE — 97112 NEUROMUSCULAR REEDUCATION: CPT | Performed by: PHYSICAL THERAPY ASSISTANT

## 2021-02-16 PROCEDURE — 97110 THERAPEUTIC EXERCISES: CPT | Performed by: PHYSICAL THERAPY ASSISTANT

## 2021-02-16 NOTE — FLOWSHEET NOTE
[facility], [address] [place] Therapy office  ([phone])     Fax [phone]     Physical Therapy Treatment Note/ Progress Report:     Date:  2021    Patient Name:  Shefali Marin    :  1975  MRN: [id number]  Restrictions/Precautions:    Medical/Treatment Diagnosis Information:  · Diagnosis: s/p R ankle trimalleolar fracture / ORIF 20  · Treatment Diagnosis: M25.571, M25.674, M27.50  Insurance/Certification information:  PT Insurance Information: Jyothi  Physician Information:  Referring Practitioner: Jean Paul Monk  Has the plan of care been signed (Y/N):        []  Yes  [x]  No     Date of Patient follow up with Physician: unknown    Is this a Progress Report:     []  Yes  [x]  No      If Yes:  Date Range for reporting period:  Initial Eval: 2020  Beginnin2020 --- Endin21  Beginnin21 -- Endin21    Progress report will be due (10 Rx or 30 days whichever is less): 57     Recertification will be due (POC Duration  / 90 days whichever is less): 3/29/21      Visit # Insurance Allowable Auth Required   In Person 2021 30 []  Yes     []  No    [facility] 0  []  Yes     []  No    Total 12       Functional Scale: LEFS: 44% (Score: 46/80)   Date assessed: 2021    Latex Allergy:  [x]NO      []YES  Preferred Language for Healthcare:   [x]English       []other:    Pain level:  3/10     SUBJECTIVE:   Has been a little more sore and tender laterally the last few days. Presents with a slightly increased antalgic gait. OBJECTIVE:    Observation:    Test measurements:  DF next to wall:  Toe distance L: 5 in, R: 1 in 21 DF 6 EV 7    RESTRICTIONS/PRECAUTIONS: WBAT in boot on 21 progress to Los brace as tolerated per MD note     Exercises/Interventions:   Therapeutic Ex (28258)  Therapeutic Activity (62048)  NMR re-education (16212) Sets/Reps Notes/CUES   Bike 6' L5 HR / slantboard X30/ 3x30\" ea gastroc / soleus         Marches  x20    HS Curl / Tanya Parisian  30# x30 / 20# x20    Mini Squat  x20 airex   Tandem squat  10x10\" ea     FSU  6\" x20    SLB 5\" x10 airex   DAVID abduction 20x 45#    Attempt step up and over, but inc pain     Lateral step down 4\" 20x         Step forward and hold  5\" x10         HR 4 walls with T-Band  Green x20 ea          Leg Press  100# x30 B / 80# x20 right only    Side steps with band 5 reps up /back    Khadra Bigness with SB 5x5                                   Towel stretch Inv/ev    BAPS (green ball) PF /DF and INV/EVR circles X20 / x20  x20 CW / x20 CCW         T-Band 4 way  At home           Sitting on SB with rolling mobs 4 ways on black foam wedge with manual assist                   Gait                Access code:[email or web address]               Manual Intervention (77845)     Moderate talocrural joint mob  Grade III 6' In sitting and in standing with movement   Light soft tissue for swelling management 5'    CFM for PF     Gentle ROM                                  Patient Education         Therapeutic Exercise and NMR EXR  [x] (06123) Provided verbal/tactile cueing for activities related to strengthening, flexibility, endurance, ROM for improvements in LE, proximal hip, and core control with self care, mobility, lifting, ambulation. [x] (16551) Provided verbal/tactile cueing for activities related to improving balance, coordination, kinesthetic sense, posture, motor skill, proprioception to assist with LE, proximal hip, and core control in self-care, mobility, lifting, ambulation and eccentric single leg control. NMR and Therapeutic Activities:    [x] (73270 or 70857) Provided verbal/tactile cueing for activities related to improving balance, coordination, kinesthetic sense, posture, motor skill, proprioception and motor activation to allow for proper function of core, proximal hip and LE with self-care and ADLs and functional mobility.    [x] (29302) Gait Re-education- Provided training and instruction to the patient for proper LE, core and proximal hip recruitment and positioning and eccentric body weight control with ambulation re-education including up and down stairs     Home Exercise Program:    [x] (73850) Reviewed/Progressed HEP activities related to strengthening, flexibility, endurance, ROM of core, proximal hip and LE for functional self-care, mobility, lifting and ambulation/stair navigation   [x] (93889) Reviewed/Progressed HEP activities related to improving balance, coordination, kinesthetic sense, posture, motor skill, proprioception of core, proximal hip and LE for self-care, mobility, lifting, and ambulation/stair navigation      Manual Treatments:  PROM / STM / Oscillations-Mobs:  G-I, II, III, IV (PA's, Inf., Post.)  [x] (17277) Provided manual therapy to mobilize LE, proximal hip and/or LS spine soft tissue/joints for the purpose of modulating pain, promoting relaxation, increasing ROM, reducing/eliminating soft tissue swelling/inflammation/restriction, improving soft tissue extensibility and allowing for proper ROM for normal function with self-care, mobility, lifting and ambulation. Modalities:   Charges:  Timed Code Treatment Minutes: 60   Total Treatment Minutes:  60   BWC:  TE TIME:  NMR TIME:  MANUAL TIME:  UNTIMED MINUTES:  Medicare Total:    n/a  n/a  n/a  n/a  n/a      [] EVAL (LOW) 57300 (typically 20 minutes face-to-face)  [] EVAL (MOD) 95633 (typically 30 minutes face-to-face)  [] EVAL (HIGH) 44815 (typically 45 minutes face-to-face)  [] RE-EVAL     [x] EN(43204) x  2   [] IONTO  [x] NMR (91535) x1     [] VASO  [x] Manual (93250) x 1    [] Other: gait  [] TA x      [] Mech Traction (95413)  [] ES(attended) (35264)      [] ES (un) (92491):    ASSESSMENT:  Patient continues to demonstrate improvements in ROM and is tolerating all strengthening and neuromuscular re-ed well. GOALS:   Short Term Goals:  To be achieved in: 2 weeks  1. Independent in HEP and progression per patient tolerance, in order to prevent re-injury. [x]? Progressing: []? Met: []? Not Met: []? Adjusted       2. Patient will have a decrease in pain to facilitate improvement in movement, function, and ADLs as indicated by Functional Deficits. [x]? Progressing: []? Met: []? Not Met: []? Adjusted          Long Term Goals: To be achieved in: 12 weeks  1. Disability index score of 20% or less for the LEFS to assist with reaching prior level of function. [x]? Progressing: []? Met: []? Not Met: []? Adjusted      2. Patient will demonstrate increased AROM to equal the opposite side bilaterally to allow for proper joint functioning as indicated by patients Functional Deficits. [x]? Progressing: []? Met: []? Not Met: []? Adjusted       3. Patient will demonstrate an increase in strength to 5/5 to allow for proper functional mobility as indicated by patients Functional Deficits. [x]? Progressing: []? Met: []? Not Met: []? Adjusted       4. Patient will return to all transfers, work activities, and functional activities without increased symptoms or restriction. [x]? Progressing: []? Met: []? Not Met: []? Adjusted       5. Patient will have 0/10 pain with ADL's. [x]? Progressing: []? Met: []? Not Met: []? Adjusted       6. Patient stated goal: Patient wants to be able to walk and hike on uneven ground without AD x 30 minutes without pain or restriction to be able to return to prior level of function. [x]? Progressing: []? Met: []? Not Met: []? Adjusted                    (Cut /Paste from eval)    Overall Progression Towards Functional goals/ Treatment Progress Update:  [x] Patient is progressing as expected towards functional goals listed. [] Progression is slowed due to complexities/Impairments listed. [] Progression has been slowed due to co-morbidities.   [] Plan just implemented, too soon to assess goals progression <30days   [] Goals require adjustment due to lack of progress  [] Patient is not progressing as expected and requires additional follow up with physician  [] Other    Prognosis for POC: [x] Good [] Fair  [] Poor    Patient requires continued skilled intervention: [x] Yes  [] No    Treatment/Activity Tolerance:  [x] Patient able to complete treatment  [] Patient limited by fatigue  [] Patient limited by pain    [] Patient limited by other medical complications  [] Other:     Return to Play: (if applicable)   []  Stage 1: Intro to Strength   []  Stage 2: Return to Run and Strength   []  Stage 3: Return to Jump and Strength   []  Stage 4: Dynamic Strength and Agility   []  Stage 5: Sport Specific Training     []  Ready to Return to Play, Meets All Above Stages   [x]  Not Ready for Return to Sports   Comments:                         PLAN:   [x] Continue per plan of care [] Alter current plan (see comments above)  [] Plan of care initiated [] Hold pending MD visit [] Discharge    Electronically signed by:  Cony Huynh PTA    Note: If patient does not return for scheduled/ recommended follow up visits, this note will serve as a discharge from care along with most recent update on progress.

## 2021-02-19 ENCOUNTER — HOSPITAL ENCOUNTER (OUTPATIENT)
Dept: PHYSICAL THERAPY | Age: 46
Setting detail: THERAPIES SERIES
Discharge: HOME | End: 2021-02-19
Payer: COMMERCIAL

## 2021-02-19 PROCEDURE — 97112 NEUROMUSCULAR REEDUCATION: CPT

## 2021-02-19 PROCEDURE — 97110 THERAPEUTIC EXERCISES: CPT

## 2021-02-19 NOTE — FLOWSHEET NOTE
[facility], [address] [place] [address] Flo Chavarria , [facility] Therapy office  ([phone])     Fax [phone]     Physical Therapy Treatment Note/ Progress Report:     Date:  2021    Patient Name:  Ginger Wing    :  1975  MRN: [id number]  Restrictions/Precautions:    Medical/Treatment Diagnosis Information:  · Diagnosis: s/p R ankle trimalleolar fracture / ORIF 20  · Treatment Diagnosis: M25.571, M25.674, S72.57  Insurance/Certification information:  PT Insurance Information: Jyothi  Physician Information:  Referring Practitioner: Diandra Barcenas  Has the plan of care been signed (Y/N):        []  Yes  [x]  No     Date of Patient follow up with Physician: unknown    Is this a Progress Report:     []  Yes  [x]  No      If Yes:  Date Range for reporting period:  Initial Eval: 2020  Beginnin2020 --- Endin21  Beginnin21 -- Endin21    Progress report will be due (10 Rx or 30 days whichever is less): 3/72/16     Recertification will be due (POC Duration  / 90 days whichever is less): 3/29/21      Visit # Insurance Allowable Auth Required   In Person  - 2021 30 []  Yes     []  No    [facility] 0  []  Yes     []  No    Total 13       Functional Scale: LEFS: 44% (Score: 46/80)   Date assessed: 2021    Latex Allergy:  [x]NO      []YES  Preferred Language for Healthcare:   [x]English       []other:    Pain level:  3/10     SUBJECTIVE:   Patient reports he has been stretching a lot this last week and he is doing okay. OBJECTIVE:    Observation:    Test measurements:  DF next to wall:  Toe distance L: 5 in, R: 2 in 21 DF 6 EV 7    RESTRICTIONS/PRECAUTIONS: WBAT in boot on 21 progress to Los brace as tolerated per MD note     Exercises/Interventions:   Therapeutic Ex (22863)  Therapeutic Activity (11671)  NMR re-education (93406) Sets/Reps Notes/CUES   Bike 6' L5                     HR / slantboard X30/ 3x30\" Aliyah Loss / soleus         Marches  x20    HS Curl / Nilesh Opp  30# x30 / 20# x20    Mini Squat  x20 airex   Tandem squat  10x10\" ea     FSU  6\" x20    SLB 5\" x10 airex   DAVID abduction 20x 45#    Attempt step up and over, but inc pain     Lateral step down 4\" 20x         Step forward and hold  5\" x10         HR 4 walls with T-Band  Green x20 ea          Leg Press  100# x30 B / 80# x20 right only    Side steps with band 5 reps up /back    Maylon Babylon with SB 5x5                                   Towel stretch Inv/ev    BAPS (green ball) PF /DF and INV/EVR circles X20 / x20  x20 CW / x20 CCW         T-Band 4 way  At home           Sitting on SB with rolling mobs 4 ways on black foam wedge with manual assist                   Gait                Access code:[email or web address]               Manual Intervention (78912)     Moderate talocrural joint mob  Grade III 6' In sitting and in standing with movement   Light soft tissue for swelling management     CFM for PF     Gentle ROM                                  Patient Education         Therapeutic Exercise and NMR EXR  [x] (13711) Provided verbal/tactile cueing for activities related to strengthening, flexibility, endurance, ROM for improvements in LE, proximal hip, and core control with self care, mobility, lifting, ambulation. [x] (20206) Provided verbal/tactile cueing for activities related to improving balance, coordination, kinesthetic sense, posture, motor skill, proprioception to assist with LE, proximal hip, and core control in self-care, mobility, lifting, ambulation and eccentric single leg control. NMR and Therapeutic Activities:    [x] (41947 or 94082) Provided verbal/tactile cueing for activities related to improving balance, coordination, kinesthetic sense, posture, motor skill, proprioception and motor activation to allow for proper function of core, proximal hip and LE with self-care and ADLs and functional mobility.    [x] (58435) Gait Re-education- Provided training and instruction to the patient for proper LE, core and proximal hip recruitment and positioning and eccentric body weight control with ambulation re-education including up and down stairs     Home Exercise Program:    [x] (32098) Reviewed/Progressed HEP activities related to strengthening, flexibility, endurance, ROM of core, proximal hip and LE for functional self-care, mobility, lifting and ambulation/stair navigation   [x] (50159) Reviewed/Progressed HEP activities related to improving balance, coordination, kinesthetic sense, posture, motor skill, proprioception of core, proximal hip and LE for self-care, mobility, lifting, and ambulation/stair navigation      Manual Treatments:  PROM / STM / Oscillations-Mobs:  G-I, II, III, IV (PA's, Inf., Post.)  [x] (35035) Provided manual therapy to mobilize LE, proximal hip and/or LS spine soft tissue/joints for the purpose of modulating pain, promoting relaxation, increasing ROM, reducing/eliminating soft tissue swelling/inflammation/restriction, improving soft tissue extensibility and allowing for proper ROM for normal function with self-care, mobility, lifting and ambulation. Modalities:   Charges:  Timed Code Treatment Minutes: 60   Total Treatment Minutes:  60   BWC:  TE TIME:  NMR TIME:  MANUAL TIME:  UNTIMED MINUTES:  Medicare Total:    n/a  n/a  n/a  n/a  n/a      [] EVAL (LOW) 20446 (typically 20 minutes face-to-face)  [] EVAL (MOD) 31981 (typically 30 minutes face-to-face)  [] EVAL (HIGH) 77466 (typically 45 minutes face-to-face)  [] RE-EVAL     [x] HK(20762) x  2   [] IONTO  [x] NMR (33470) x2     [] VASO  [] Manual (86179) x     [] Other: gait  [] TA x      [] Mech Traction (62227)  [] ES(attended) (42252)      [] ES (un) (93347):    ASSESSMENT:  Patient continues to demonstrate improvements in ROM and continues to demonstrate improvements in balance. GOALS:   Short Term Goals: To be achieved in: 2 weeks  1.  Independent in HEP and progression per patient tolerance, in order to prevent re-injury. [x]? Progressing: []? Met: []? Not Met: []? Adjusted       2. Patient will have a decrease in pain to facilitate improvement in movement, function, and ADLs as indicated by Functional Deficits. [x]? Progressing: []? Met: []? Not Met: []? Adjusted          Long Term Goals: To be achieved in: 12 weeks  1. Disability index score of 20% or less for the LEFS to assist with reaching prior level of function. [x]? Progressing: []? Met: []? Not Met: []? Adjusted      2. Patient will demonstrate increased AROM to equal the opposite side bilaterally to allow for proper joint functioning as indicated by patients Functional Deficits. [x]? Progressing: []? Met: []? Not Met: []? Adjusted       3. Patient will demonstrate an increase in strength to 5/5 to allow for proper functional mobility as indicated by patients Functional Deficits. [x]? Progressing: []? Met: []? Not Met: []? Adjusted       4. Patient will return to all transfers, work activities, and functional activities without increased symptoms or restriction. [x]? Progressing: []? Met: []? Not Met: []? Adjusted       5. Patient will have 0/10 pain with ADL's. [x]? Progressing: []? Met: []? Not Met: []? Adjusted       6. Patient stated goal: Patient wants to be able to walk and hike on uneven ground without AD x 30 minutes without pain or restriction to be able to return to prior level of function. [x]? Progressing: []? Met: []? Not Met: []? Adjusted                    (Cut /Paste from eval)    Overall Progression Towards Functional goals/ Treatment Progress Update:  [x] Patient is progressing as expected towards functional goals listed. [] Progression is slowed due to complexities/Impairments listed. [] Progression has been slowed due to co-morbidities.   [] Plan just implemented, too soon to assess goals progression <30days   [] Goals require adjustment due to lack of progress  [] Patient is not progressing as expected and requires additional follow up with physician  [] Other    Prognosis for POC: [x] Good [] Fair  [] Poor    Patient requires continued skilled intervention: [x] Yes  [] No    Treatment/Activity Tolerance:  [x] Patient able to complete treatment  [] Patient limited by fatigue  [] Patient limited by pain    [] Patient limited by other medical complications  [] Other:     Return to Play: (if applicable)   []  Stage 1: Intro to Strength   []  Stage 2: Return to Run and Strength   []  Stage 3: Return to Jump and Strength   []  Stage 4: Dynamic Strength and Agility   []  Stage 5: Sport Specific Training     []  Ready to Return to Play, Meets All Above Stages   [x]  Not Ready for Return to Sports   Comments:                         PLAN:   [x] Continue per plan of care [] Alter current plan (see comments above)  [] Plan of care initiated [] Hold pending MD visit [] Discharge    Electronically signed by:  Kwame Jose PT    Note: If patient does not return for scheduled/ recommended follow up visits, this note will serve as a discharge from care along with most recent update on progress.

## 2021-02-23 ENCOUNTER — HOSPITAL ENCOUNTER (OUTPATIENT)
Dept: PHYSICAL THERAPY | Age: 46
Setting detail: THERAPIES SERIES
Discharge: HOME | End: 2021-02-23
Payer: COMMERCIAL

## 2021-02-23 NOTE — FLOWSHEET NOTE
[facility], [facility])    Physical Therapy  Cancellation/No-show Note  Patient Name:  Jessica Mack  :  1975   Date:  2021    Cancelled visits to date: 1  No-shows to date: 0    For today's appointment patient:  [x]  Cancelled  []  Rescheduled appointment  []  No-show     Reason given by patient:  []  Patient ill  []  Conflicting appointment  []  No transportation    [x]  Conflict with work  []  No reason given  []  Other:     Comments:      Phone call information:   []  Phone call made today to patient. []  Patient answered, conversation as follows:    []  Patient did not answer. []  Phone call not made today  [x]  Phone call not needed - pt contacted us to cancel and provided reason for cancellation.      Electronically signed by:  Maxine Cotton PTA

## 2021-02-24 ENCOUNTER — HOSPITAL ENCOUNTER (OUTPATIENT)
Dept: PHYSICAL THERAPY | Age: 46
Setting detail: THERAPIES SERIES
End: 2021-02-24
Payer: COMMERCIAL

## 2021-02-26 ENCOUNTER — HOSPITAL ENCOUNTER (OUTPATIENT)
Dept: PHYSICAL THERAPY | Age: 46
Setting detail: THERAPIES SERIES
Discharge: HOME | End: 2021-02-26
Payer: COMMERCIAL

## 2021-02-26 PROCEDURE — 97110 THERAPEUTIC EXERCISES: CPT

## 2021-02-26 PROCEDURE — 97112 NEUROMUSCULAR REEDUCATION: CPT

## 2021-02-26 NOTE — FLOWSHEET NOTE
[facility], [address] [place] [address] Flo Chavarria [address], [facility] Therapy office  ([phone])     Fax [phone]     Physical Therapy Treatment Note/ Progress Report:     Date:  2021    Patient Name:  Av Rome    :  1975  MRN: [id number]  Restrictions/Precautions:    Medical/Treatment Diagnosis Information:  · Diagnosis: s/p R ankle trimalleolar fracture / ORIF 20  · Treatment Diagnosis: M25.571, M25.674, P12.31  Insurance/Certification information:  PT Insurance Information: Jyothi  Physician Information:  Referring Practitioner: Cameron Olivas  Has the plan of care been signed (Y/N):        []  Yes  [x]  No     Date of Patient follow up with Physician: unknown    Is this a Progress Report:     []  Yes  [x]  No      If Yes:  Date Range for reporting period:  Initial Eval: 2020  Beginnin2020 --- Endin21  Beginnin21 -- Endin21    Progress report will be due (10 Rx or 30 days whichever is less):      Recertification will be due (POC Duration  / 90 days whichever is less): 3/29/21      Visit # Insurance Allowable Auth Required   In Person  - 2021 30 []  Yes     []  No    [facility] 0  []  Yes     []  No    Total 14       Functional Scale: LEFS: 44% (Score: 46/80)   Date assessed: 2021    Latex Allergy:  [x]NO      []YES  Preferred Language for Healthcare:   [x]English       []other:    Pain level:  3/10      SUBJECTIVE:   Patient reports he has been noticing small improvements each day. He is still having a lot of difficulty going down stairs, but is doing much better with going up stairs. He has noticed continued improvements       OBJECTIVE:    Observation:    Test measurements:  DF next to wall:  Toe distance L: 5 in, R: 2 in 21 DF 6 EV 7    RESTRICTIONS/PRECAUTIONS: WBAT in boot on 21 progress to Los brace as tolerated per MD note     Exercises/Interventions:   Therapeutic Ex activation to allow for proper function of core, proximal hip and LE with self-care and ADLs and functional mobility. [x] (40240) Gait Re-education- Provided training and instruction to the patient for proper LE, core and proximal hip recruitment and positioning and eccentric body weight control with ambulation re-education including up and down stairs     Home Exercise Program:    [x] (43867) Reviewed/Progressed HEP activities related to strengthening, flexibility, endurance, ROM of core, proximal hip and LE for functional self-care, mobility, lifting and ambulation/stair navigation   [x] (50681) Reviewed/Progressed HEP activities related to improving balance, coordination, kinesthetic sense, posture, motor skill, proprioception of core, proximal hip and LE for self-care, mobility, lifting, and ambulation/stair navigation      Manual Treatments:  PROM / STM / Oscillations-Mobs:  G-I, II, III, IV (PA's, Inf., Post.)  [x] (08221) Provided manual therapy to mobilize LE, proximal hip and/or LS spine soft tissue/joints for the purpose of modulating pain, promoting relaxation, increasing ROM, reducing/eliminating soft tissue swelling/inflammation/restriction, improving soft tissue extensibility and allowing for proper ROM for normal function with self-care, mobility, lifting and ambulation.      Modalities:   Charges:  Timed Code Treatment Minutes: 60   Total Treatment Minutes:  60   BWC:  TE TIME:  NMR TIME:  MANUAL TIME:  UNTIMED MINUTES:  Medicare Total:    n/a  n/a  n/a  n/a  n/a      [] EVAL (LOW) 78212 (typically 20 minutes face-to-face)  [] EVAL (MOD) 56927 (typically 30 minutes face-to-face)  [] EVAL (HIGH) 37695 (typically 45 minutes face-to-face)  [] RE-EVAL     [x] UL(31145) x  2   [] IONTO  [x] NMR (32715) x2     [] VASO  [] Manual (61529) x     [] Other: gait  [] TA x      [] Mech Traction (41263)  [] ES(attended) (43248)      [] ES (un) (61096):    ASSESSMENT:  Patient continues to demonstrate improvements in ROM and continues to demonstrate improvements in balance. Continues to have difficulty with stepping down. GOALS:   Short Term Goals: To be achieved in: 2 weeks  1. Independent in HEP and progression per patient tolerance, in order to prevent re-injury. [x]? Progressing: []? Met: []? Not Met: []? Adjusted       2. Patient will have a decrease in pain to facilitate improvement in movement, function, and ADLs as indicated by Functional Deficits. [x]? Progressing: []? Met: []? Not Met: []? Adjusted          Long Term Goals: To be achieved in: 12 weeks  1. Disability index score of 20% or less for the LEFS to assist with reaching prior level of function. [x]? Progressing: []? Met: []? Not Met: []? Adjusted      2. Patient will demonstrate increased AROM to equal the opposite side bilaterally to allow for proper joint functioning as indicated by patients Functional Deficits. [x]? Progressing: []? Met: []? Not Met: []? Adjusted       3. Patient will demonstrate an increase in strength to 5/5 to allow for proper functional mobility as indicated by patients Functional Deficits. [x]? Progressing: []? Met: []? Not Met: []? Adjusted       4. Patient will return to all transfers, work activities, and functional activities without increased symptoms or restriction. [x]? Progressing: []? Met: []? Not Met: []? Adjusted       5. Patient will have 0/10 pain with ADL's. [x]? Progressing: []? Met: []? Not Met: []? Adjusted       6. Patient stated goal: Patient wants to be able to walk and hike on uneven ground without AD x 30 minutes without pain or restriction to be able to return to prior level of function. [x]? Progressing: []? Met: []? Not Met: []? Adjusted                    (Cut /Paste from eval)    Overall Progression Towards Functional goals/ Treatment Progress Update:  [x] Patient is progressing as expected towards functional goals listed.     [] Progression is slowed due to complexities/Impairments listed. [] Progression has been slowed due to co-morbidities. [] Plan just implemented, too soon to assess goals progression <30days   [] Goals require adjustment due to lack of progress  [] Patient is not progressing as expected and requires additional follow up with physician  [] Other    Prognosis for POC: [x] Good [] Fair  [] Poor    Patient requires continued skilled intervention: [x] Yes  [] No    Treatment/Activity Tolerance:  [x] Patient able to complete treatment  [] Patient limited by fatigue  [] Patient limited by pain    [] Patient limited by other medical complications  [] Other:     Return to Play: (if applicable)   []  Stage 1: Intro to Strength   []  Stage 2: Return to Run and Strength   []  Stage 3: Return to Jump and Strength   []  Stage 4: Dynamic Strength and Agility   []  Stage 5: Sport Specific Training     []  Ready to Return to Play, Meets All Above Stages   [x]  Not Ready for Return to Sports   Comments:                         PLAN:   [x] Continue per plan of care [] Alter current plan (see comments above)  [] Plan of care initiated [] Hold pending MD visit [] Discharge    Electronically signed by:  Nae Collado PT    Note: If patient does not return for scheduled/ recommended follow up visits, this note will serve as a discharge from care along with most recent update on progress.

## 2021-03-02 ENCOUNTER — HOSPITAL ENCOUNTER (OUTPATIENT)
Dept: PHYSICAL THERAPY | Age: 46
Setting detail: THERAPIES SERIES
Discharge: HOME | End: 2021-03-02
Payer: COMMERCIAL

## 2021-03-02 PROCEDURE — 97112 NEUROMUSCULAR REEDUCATION: CPT | Performed by: PHYSICAL THERAPY ASSISTANT

## 2021-03-02 PROCEDURE — 97140 MANUAL THERAPY 1/> REGIONS: CPT | Performed by: PHYSICAL THERAPY ASSISTANT

## 2021-03-02 PROCEDURE — 97110 THERAPEUTIC EXERCISES: CPT | Performed by: PHYSICAL THERAPY ASSISTANT

## 2021-03-02 NOTE — FLOWSHEET NOTE
[facility],  [place] [address] [facility] office  ([phone])     Fax [phone]     Physical Therapy Treatment Note/ Progress Report:     Date:  3/2/2021    Patient Name:  Geovanny Cruz    :  1975  MRN: [id number]  Restrictions/Precautions:    Medical/Treatment Diagnosis Information:  · Diagnosis: s/p R ankle trimalleolar fracture / ORIF 20  · Treatment Diagnosis: M25.571, M25.674, P95.68  Insurance/Certification information:  PT Insurance Information: Jyothi  Physician Information:  Referring Practitioner: Valorie Jimenez  Has the plan of care been signed (Y/N):        []  Yes  [x]  No     Date of Patient follow up with Physician: unknown    Is this a Progress Report:     []  Yes  [x]  No      If Yes:  Date Range for reporting period:  Initial Eval: 2020  Beginnin2020 --- Endin21  Beginnin21 -- Endin21    Progress report will be due (10 Rx or 30 days whichever is less):      Recertification will be due (POC Duration  / 90 days whichever is less): 3/29/21      Visit # Insurance Allowable Auth Required   In Person  - 2021 30 []  Yes     []  No    [facility] 0  []  Yes     []  No    Total 15       Functional Scale: LEFS: 44% (Score: 46/80)   Date assessed: 2021    Latex Allergy:  [x]NO      []YES  Preferred Language for Healthcare:   [x]English       []other:    Pain level:  3/10      SUBJECTIVE:     Doing well. 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 OBJECTIVE:    Observation:    Test measurements:  DF next to wall:  Toe distance L: 5 in, R: 2 in 21 DF 6 EV 7    RESTRICTIONS/PRECAUTIONS: WBAT in boot on 21 progress to Los brace as tolerated per MD note     Exercises/Interventions:   Therapeutic Ex (94068)  Therapeutic Activity (94044)  NMR re-education (00951) Sets/Reps Notes/CUES   Elliptical 5' L5                     HR / slantboard X30/ x10 up with two and down with one/ 3x30\" ea gastroc / soleus         Marches  x20    HS Curl / LAQ  30# x30 / 20# x20    Mini Squat  x20 airex   Tandem squat  10x10\" ea     FSU  6\" x15 with a single leg hold    SLB 5\" x5 airex soreness medially   DAVID abduction 20x 45#    Attempt step up and over, but inc pain     Lateral step down 4\" 20x         Step forward off 4\" box and hold           Step and hold for / lat  5\" x10 ea dir              HR 4 walls with T-Band  Green x20 ea          Leg Press  100# x30 B / 80# x20 right only    Side steps with band 5 reps up /back    Khadra Bigness with SB 5x5                                   Towel stretch Inv/ev    BAPS (green ball) PF /DF and INV/EVR circles X20 / x20  x20 CW / x20 CCW         T-Band 4 way  X20 ea redAt home           Sitting on SB with rolling mobs 4 ways on black foam wedge with manual assist                   Gait                Access code:[email or web address]               Manual Intervention (75667)     Moderate talocrural joint mob  Grade III 10' I   Light soft tissue for swelling management     CFM for PF      ROM 5'                                  Patient Education         Therapeutic Exercise and NMR EXR  [x] (40285) Provided verbal/tactile cueing for activities related to strengthening, flexibility, endurance, ROM for improvements in LE, proximal hip, and core control with self care, mobility, lifting, ambulation. [x] (32143) Provided verbal/tactile cueing for activities related to improving balance, coordination, kinesthetic sense, posture, motor skill, proprioception to assist with LE, proximal hip, and core control in self-care, mobility, lifting, ambulation and eccentric single leg control.      NMR and Therapeutic Activities:    [x] (33303 or 42484) Provided verbal/tactile cueing for activities related to improving balance, coordination, kinesthetic sense, posture, motor skill, proprioception and motor activation to allow for proper function of core, proximal hip and LE with self-care and ADLs and functional mobility. [x] (96355) Gait Re-education- Provided training and instruction to the patient for proper LE, core and proximal hip recruitment and positioning and eccentric body weight control with ambulation re-education including up and down stairs     Home Exercise Program:    [x] (72586) Reviewed/Progressed HEP activities related to strengthening, flexibility, endurance, ROM of core, proximal hip and LE for functional self-care, mobility, lifting and ambulation/stair navigation   [x] (97417) Reviewed/Progressed HEP activities related to improving balance, coordination, kinesthetic sense, posture, motor skill, proprioception of core, proximal hip and LE for self-care, mobility, lifting, and ambulation/stair navigation      Manual Treatments:  PROM / STM / Oscillations-Mobs:  G-I, II, III, IV (PA's, Inf., Post.)  [x] (75369) Provided manual therapy to mobilize LE, proximal hip and/or LS spine soft tissue/joints for the purpose of modulating pain, promoting relaxation, increasing ROM, reducing/eliminating soft tissue swelling/inflammation/restriction, improving soft tissue extensibility and allowing for proper ROM for normal function with self-care, mobility, lifting and ambulation.      Modalities:   Charges:  Timed Code Treatment Minutes: 60   Total Treatment Minutes:  60   BWC:  TE TIME:  NMR TIME:  MANUAL TIME:  UNTIMED MINUTES:  Medicare Total:    n/a  n/a  n/a  n/a  n/a      [] EVAL (LOW) 90530 (typically 20 minutes face-to-face)  [] EVAL (MOD) 05238 (typically 30 minutes face-to-face)  [] EVAL (HIGH) 82275 (typically 45 minutes face-to-face)  [] RE-EVAL     [x] HC(78694) x  2   [] IONTO  [x] NMR (93424) x1     [] VASO  [x] Manual (05237) x1     [] Other: gait  [] TA x      [] Mech Traction (65507)  [] ES(attended) (95010)      [] ES (un) (39082):    ASSESSMENT:  Patient continues to demonstrate improvements in ROM and continues to demonstrate improvements in balance. Continues to have difficulty with stepping down. GOALS:   Short Term Goals: To be achieved in: 2 weeks  1. Independent in HEP and progression per patient tolerance, in order to prevent re-injury. [x]? Progressing: []? Met: []? Not Met: []? Adjusted       2. Patient will have a decrease in pain to facilitate improvement in movement, function, and ADLs as indicated by Functional Deficits. [x]? Progressing: []? Met: []? Not Met: []? Adjusted          Long Term Goals: To be achieved in: 12 weeks  1. Disability index score of 20% or less for the LEFS to assist with reaching prior level of function. [x]? Progressing: []? Met: []? Not Met: []? Adjusted      2. Patient will demonstrate increased AROM to equal the opposite side bilaterally to allow for proper joint functioning as indicated by patients Functional Deficits. [x]? Progressing: []? Met: []? Not Met: []? Adjusted       3. Patient will demonstrate an increase in strength to 5/5 to allow for proper functional mobility as indicated by patients Functional Deficits. [x]? Progressing: []? Met: []? Not Met: []? Adjusted       4. Patient will return to all transfers, work activities, and functional activities without increased symptoms or restriction. [x]? Progressing: []? Met: []? Not Met: []? Adjusted       5. Patient will have 0/10 pain with ADL's. [x]? Progressing: []? Met: []? Not Met: []? Adjusted       6. Patient stated goal: Patient wants to be able to walk and hike on uneven ground without AD x 30 minutes without pain or restriction to be able to return to prior level of function. [x]? Progressing: []? Met: []? Not Met: []?  Adjusted                    (Cut /Paste from eval)    Overall Progression Towards Functional goals/ Treatment Progress Update:  [x] Patient is progressing as expected towards functional goals listed. [] Progression is slowed due to complexities/Impairments listed. [] Progression has been slowed due to co-morbidities. [] Plan just implemented, too soon to assess goals progression <30days   [] Goals require adjustment due to lack of progress  [] Patient is not progressing as expected and requires additional follow up with physician  [] Other    Prognosis for POC: [x] Good [] Fair  [] Poor    Patient requires continued skilled intervention: [x] Yes  [] No    Treatment/Activity Tolerance:  [x] Patient able to complete treatment  [] Patient limited by fatigue  [] Patient limited by pain    [] Patient limited by other medical complications  [] Other:     Return to Play: (if applicable)   []  Stage 1: Intro to Strength   []  Stage 2: Return to Run and Strength   []  Stage 3: Return to Jump and Strength   []  Stage 4: Dynamic Strength and Agility   []  Stage 5: Sport Specific Training     []  Ready to Return to Play, Meets All Above Stages   [x]  Not Ready for Return to Sports   Comments:                         PLAN:   [x] Continue per plan of care [] Alter current plan (see comments above)  [] Plan of care initiated [] Hold pending MD visit [] Discharge    Electronically signed by:  Sudhakar Jacinto PTA    Note: If patient does not return for scheduled/ recommended follow up visits, this note will serve as a discharge from care along with most recent update on progress.

## 2021-03-05 ENCOUNTER — HOSPITAL ENCOUNTER (OUTPATIENT)
Dept: PHYSICAL THERAPY | Age: 46
Setting detail: THERAPIES SERIES
Discharge: HOME | End: 2021-03-05
Payer: COMMERCIAL

## 2021-03-05 PROCEDURE — 97112 NEUROMUSCULAR REEDUCATION: CPT

## 2021-03-05 PROCEDURE — 97110 THERAPEUTIC EXERCISES: CPT

## 2021-03-05 PROCEDURE — 97140 MANUAL THERAPY 1/> REGIONS: CPT

## 2021-03-05 NOTE — FLOWSHEET NOTE
[facility],  [facility] [address], [address] Therapy office  ([phone]) Fax [phone]   Date: 3/5/2021          Patient Name; :  Benny Luz; 1975   Dx/ICD Code: s/p R trimalleolar fx / ORIF 20       Physician: Elli Calvin        Total PT Visits: 16     Measures Previous Current   Pain (0-10) 4/10 0.5   Disability % 44% 29%   ROM  DF next to wall: Toe distance L: 5 in, R: 2 in  DF 10 deg     PF 50 deg     EV 11 deg, Inv 22 deg   Strength 3-/5 PF strength 3+/5 able to walk on toes for first time               Specific Functional Improvements & Impressions:  Patient demonstrating significant improvements in ROM and strength of the R ankle/foot. Patient is now ambulating without AD and using brace on uneven surfaces as needed. He continues to demonstrate slight early heel rise during stance phase, but it is minimal. He is wanting to eventually get back to running, but he is not ready to start the progression toward that at this time. Would like for patient to discuss this with MD to gain insight about expectations and timeline. Plan & Recommendations:  [x] Continue rehabilitation due to objective improvement and continued functional deficits with frequency and duration: 2x/week for 6 weeks  [] Progress toward  []GAP, []Work Conditioning, []Independent HEP   [] Discharge due to   [] All goals achieved, [] Maximized \"medical necessity\" [] No subjective or objective improvements      Electronically signed by:  Damaris Gaffney, PT  Therapy Plan of Care Re-Certification  This patient has been re-evaluated for physical therapy services and for therapy to continue, Medicare, Medicaid and other insurances require periodic physician review of the treatment plan.  Please review the above re-evaluation and verify that you agree with plan of care as established above by signing the attached document and return it to our office or note changes to established plan below  [] Follow treatment plan as above [] Discontinue physical therapy  [] Change plan to:                                 __________________________________________________    Physician Signature:____________________________________ Date:____________  By signing above, therapists plan is approved by physician    If you have any questions or concerns, please don't hesitate to call. Thank you for your referral.    [facility] [address] office  ([phone])     Fax [phone]     Physical Therapy Treatment Note/ Progress Report:     Date:  3/5/2021    Patient Name:  Linda Batres    :  1975  MRN: [id number]  Restrictions/Precautions:    Medical/Treatment Diagnosis Information:  · Diagnosis: s/p R ankle trimalleolar fracture / ORIF 20  · Treatment Diagnosis: M25.571, M25.674, Y98.21  Insurance/Certification information:  PT Insurance Information: Jyothi  Physician Information:  Referring Practitioner: Shraddha Azul  Has the plan of care been signed (Y/N):        []  Yes  [x]  No     Date of Patient follow up with Physician: 3/15/21    Is this a Progress Report:     []  Yes  [x]  No      If Yes:  Date Range for reporting period:  Initial Eval: 2020  Beginnin2020 --- Endin21  Beginnin21 -- Endin21  Beginning: 3/5/21 -- endin21    Progress report will be due (10 Rx or 30 days whichever is less): 3/4/93     Recertification will be due (POC Duration  / 90 days whichever is less):21      Visit # Insurance Allowable Auth Required   In Person 15 - 2021 -  30 []  Yes     []  No    [facility] 0  []  Yes     []  No    Total 16       Functional Scale: LEFS: 44% (Score: 46/80)   Date assessed: 2021    Latex Allergy:  [x]NO      []YES  Preferred Language for Healthcare:   [x]English       []other:    Pain level:  0.5/10      SUBJECTIVE: Patient continues to report improved gait and feels that he continues to gain strength.  He is noticing small gains each day and was able to walk on toes for a few steps today. OBJECTIVE:    Observation:    Test measurements:  DF next to wall: Toe distance L: 5 in, R: 2 in 2/19/21  1/29/21 DF 6 EV 7    RESTRICTIONS/PRECAUTIONS: WBAT in boot on 1/18/21 progress to Los brace as tolerated per MD note     Exercises/Interventions:   Therapeutic Ex (31818)  Therapeutic Activity (94297)  NMR re-education (64789) Sets/Reps Notes/CUES   Elliptical 5' L5                     HR / slantboard X30/ x10 up with two and down with one/ 3x30\" ea gastroc / soleus         Marches  x20    HS Curl / Rylee   30# x30 / 20# x20    Mini Squat  x20 airex   Tandem squat  10x10\" ea     FSU  6\" x15 with a single leg hold    SLB 5\" x5 airex soreness medially   DAVID abduction 20x 45#    Attempt step up and over, but inc pain     Lateral step down 4\" 20x         Step forward off 4\" box and hold           Step and hold for / lat  5\" x10 ea dir              HR 4 walls with T-Band  Green x20 ea          Leg Press  100# x30 B / 80# x20 right only    Side steps with band 5 reps up /back    Cathlene Ban with SB 5x5                                   Towel stretch Inv/ev    BAPS (green ball) PF /DF and INV/EVR circles X20 / x20  x20 CW / x20 CCW         T-Band 4 way  X20 ea redAt home           Sitting on SB with rolling mobs 4 ways on black foam wedge with manual assist                   Gait                Access code:[email or web address]               Manual Intervention (25603)     Moderate talocrural joint mob  Grade III 5' I   Light soft tissue heel 5'    CFM for PF      ROM                                   Patient Education         Therapeutic Exercise and NMR EXR  [x] (11242) Provided verbal/tactile cueing for activities related to strengthening, flexibility, endurance, ROM for improvements in LE, proximal hip, and core control with self care, mobility, lifting, ambulation.   [x] (24790) Provided verbal/tactile cueing for activities related to improving balance, coordination, kinesthetic sense, posture, motor skill, proprioception to assist with LE, proximal hip, and core control in self-care, mobility, lifting, ambulation and eccentric single leg control. NMR and Therapeutic Activities:    [x] (79837 or 38060) Provided verbal/tactile cueing for activities related to improving balance, coordination, kinesthetic sense, posture, motor skill, proprioception and motor activation to allow for proper function of core, proximal hip and LE with self-care and ADLs and functional mobility. [x] (33227) Gait Re-education- Provided training and instruction to the patient for proper LE, core and proximal hip recruitment and positioning and eccentric body weight control with ambulation re-education including up and down stairs     Home Exercise Program:    [x] (12703) Reviewed/Progressed HEP activities related to strengthening, flexibility, endurance, ROM of core, proximal hip and LE for functional self-care, mobility, lifting and ambulation/stair navigation   [x] (58700) Reviewed/Progressed HEP activities related to improving balance, coordination, kinesthetic sense, posture, motor skill, proprioception of core, proximal hip and LE for self-care, mobility, lifting, and ambulation/stair navigation      Manual Treatments:  PROM / STM / Oscillations-Mobs:  G-I, II, III, IV (PA's, Inf., Post.)  [x] (92418) Provided manual therapy to mobilize LE, proximal hip and/or LS spine soft tissue/joints for the purpose of modulating pain, promoting relaxation, increasing ROM, reducing/eliminating soft tissue swelling/inflammation/restriction, improving soft tissue extensibility and allowing for proper ROM for normal function with self-care, mobility, lifting and ambulation.      Modalities:   Charges:  Timed Code Treatment Minutes: 60   Total Treatment Minutes:  60   BWC:  TE TIME:  NMR TIME:  MANUAL TIME:  UNTIMED MINUTES:  Medicare Total:    n/a  n/a  n/a  n/a  n/a      [] EVAL (LOW) 33255 (typically 20 minutes face-to-face)  [] EVAL (MOD) 20060 (typically 30 minutes face-to-face)  [] EVAL (HIGH) 12503 (typically 45 minutes face-to-face)  [] RE-EVAL     [x] WF(35601) x  2   [] IONTO  [x] NMR (21485) x1     [] VASO  [x] Manual (78360) x1     [] Other: gait  [] TA x      [] Mech Traction (19035)  [] ES(attended) (19278)      [] ES (un) (08878):    ASSESSMENT:  See PN     GOALS:   Short Term Goals: To be achieved in: 2 weeks  1. Independent in HEP and progression per patient tolerance, in order to prevent re-injury. [x]? Progressing: []? Met: []? Not Met: []? Adjusted       2. Patient will have a decrease in pain to facilitate improvement in movement, function, and ADLs as indicated by Functional Deficits. [x]? Progressing: []? Met: []? Not Met: []? Adjusted          Long Term Goals: To be achieved in: 12 weeks  1. Disability index score of 20% or less for the LEFS to assist with reaching prior level of function. [x]? Progressing: []? Met: []? Not Met: []? Adjusted      2. Patient will demonstrate increased AROM to equal the opposite side bilaterally to allow for proper joint functioning as indicated by patients Functional Deficits. [x]? Progressing: []? Met: []? Not Met: []? Adjusted       3. Patient will demonstrate an increase in strength to 5/5 to allow for proper functional mobility as indicated by patients Functional Deficits. [x]? Progressing: []? Met: []? Not Met: []? Adjusted       4. Patient will return to all transfers, work activities, and functional activities without increased symptoms or restriction. [x]? Progressing: []? Met: []? Not Met: []? Adjusted       5. Patient will have 0/10 pain with ADL's. [x]? Progressing: []? Met: []? Not Met: []? Adjusted       6. Patient stated goal: Patient wants to be able to walk and hike on uneven ground without AD x 30 minutes without pain or restriction to be able to return to prior level of function. [x]? Progressing: []? Met: []? Not Met: []?  Adjusted

## 2021-03-09 ENCOUNTER — HOSPITAL ENCOUNTER (OUTPATIENT)
Dept: PHYSICAL THERAPY | Age: 46
Setting detail: THERAPIES SERIES
Discharge: HOME | End: 2021-03-09
Payer: COMMERCIAL

## 2021-03-09 PROCEDURE — 97110 THERAPEUTIC EXERCISES: CPT | Performed by: PHYSICAL THERAPY ASSISTANT

## 2021-03-09 PROCEDURE — 97112 NEUROMUSCULAR REEDUCATION: CPT | Performed by: PHYSICAL THERAPY ASSISTANT

## 2021-03-09 PROCEDURE — 97140 MANUAL THERAPY 1/> REGIONS: CPT | Performed by: PHYSICAL THERAPY ASSISTANT

## 2021-03-09 NOTE — FLOWSHEET NOTE
[facility], [address] [place] [address], [address] Therapy office  ([phone]) Fax [phone]     Physical Therapy Treatment Note/ Progress Report:     Date:  3/9/2021    Patient Name:  Kenia Bennett    :  1975  MRN: [id number]  Restrictions/Precautions:    Medical/Treatment Diagnosis Information:  · Diagnosis: s/p R ankle trimalleolar fracture / ORIF 20  · Treatment Diagnosis: M25.571, M25.674, U66.21  Insurance/Certification information:  PT Insurance Information: Jyothi  Physician Information:  Referring Practitioner: Christiano Mims  Has the plan of care been signed (Y/N):        []  Yes  [x]  No     Date of Patient follow up with Physician: 3/15/21    Is this a Progress Report:     []  Yes  [x]  No      If Yes:  Date Range for reporting period:  Initial Eval: 2020  Beginnin2020 --- Endin21  Beginnin21 -- Endin21  Beginning: 3/5/21 -- endin21    Progress report will be due (10 Rx or 30 days whichever is less): 3/0/92     Recertification will be due (POC Duration  / 90 days whichever is less):21      Visit # Insurance Allowable Auth Required   In Person  - 2021 30 []  Yes     []  No    [facility] 0  []  Yes     []  No    Total 18       Functional Scale: LEFS: 44% (Score: 46/80)   Date assessed: 2021  Functional Scale: LEFS: 29%     3/5/21    Latex Allergy:  [x]NO      []YES  Preferred Language for Healthcare:   [x]English       []other:    Pain level:  0.5/10      SUBJECTIVE: \"feeling really good\"    OBJECTIVE:    Observation:    Test measurements:  DF next to wall:  Toe distance L: 5 in, R: 2 in 21 DF 6 EV 7    RESTRICTIONS/PRECAUTIONS: WBAT in boot on 21 progress to Los brace as tolerated per MD note     Exercises/Interventions:   Therapeutic Ex (72920)  Therapeutic Activity (54932)  NMR re-education (97664) Sets/Reps Notes/CUES   Elliptical 5' L5             HR / slantboard x20/ x15 up with two and down with one/ 3x30\" ea gastroc / soleus              HS Curl / Shree Shena  30# x30 / 20# x30    Mini Squat with HR  Mini squat x9  x10 airex    SLB with reach noon, 9, & 6 6 rounds         Tandem squat  10x10\" ea     FSU  6\" x15 with a single leg hold    Step and hold 5\" x10 airex-forward  5\" x10 lateral - floor    DAVID abduction 20x 45#    Lateral step down 4\" 20x    Soleus pumps 10# x20    [place]  x5 up and back at [facility] [place] bar         HR 4 walls with T-Band  Green x20 ea          Leg Press  100# x30 B / 80# x30 right only    Side steps with band 5 reps up Keyanna Jobs with SB 10 x 10\"                                   Towel stretch Inv/ev    BAPS (green ball) PF /DF and INV/EVR circles X20 / x20  x20 CW / x20 CCW         T-Band 4 way  X20 ea [employer] home           Sitting on SB with rolling mobs 4 ways on black foam wedge with manual assist                   Gait                Access code:[email or web address]               Manual Intervention (53283)     Moderate talocrural joint mob  Grade III 5' I   Light soft tissue heel 5'    CFM for PF      ROM                                   Patient Education         Therapeutic Exercise and NMR EXR  [x] (04764) Provided verbal/tactile cueing for activities related to strengthening, flexibility, endurance, ROM for improvements in LE, proximal hip, and core control with self care, mobility, lifting, ambulation. [x] (32484) Provided verbal/tactile cueing for activities related to improving balance, coordination, kinesthetic sense, posture, motor skill, proprioception to assist with LE, proximal hip, and core control in self-care, mobility, lifting, ambulation and eccentric single leg control.      NMR and Therapeutic Activities:    [x] (17569 or 18798) Provided verbal/tactile cueing for activities related to improving balance, coordination, kinesthetic sense, posture, motor skill, proprioception and motor activation to allow for proper function of core, proximal hip and LE with self-care and ADLs and functional mobility. [x] (38499) Gait Re-education- Provided training and instruction to the patient for proper LE, core and proximal hip recruitment and positioning and eccentric body weight control with ambulation re-education including up and down stairs     Home Exercise Program:    [x] (90837) Reviewed/Progressed HEP activities related to strengthening, flexibility, endurance, ROM of core, proximal hip and LE for functional self-care, mobility, lifting and ambulation/stair navigation   [x] (27013) Reviewed/Progressed HEP activities related to improving balance, coordination, kinesthetic sense, posture, motor skill, proprioception of core, proximal hip and LE for self-care, mobility, lifting, and ambulation/stair navigation      Manual Treatments:  PROM / STM / Oscillations-Mobs:  G-I, II, III, IV (PA's, Inf., Post.)  [x] (03126) Provided manual therapy to mobilize LE, proximal hip and/or LS spine soft tissue/joints for the purpose of modulating pain, promoting relaxation, increasing ROM, reducing/eliminating soft tissue swelling/inflammation/restriction, improving soft tissue extensibility and allowing for proper ROM for normal function with self-care, mobility, lifting and ambulation. Modalities:   Charges:  Timed Code Treatment Minutes: 60   Total Treatment Minutes:  60   BWC:  TE TIME:  NMR TIME:  MANUAL TIME:  UNTIMED MINUTES:  Medicare Total:    n/a  n/a  n/a  n/a  n/a      [] EVAL (LOW) 68048 (typically 20 minutes face-to-face)  [] EVAL (MOD) 72243 (typically 30 minutes face-to-face)  [] EVAL (HIGH) 67479 (typically 45 minutes face-to-face)  [] RE-EVAL     [x] QC(43437) x  2   [] IONTO  [x] NMR (70870) x1     [] VASO  [x] Manual (13838) x1     [] Other: gait  [] TA x      [] Mech Traction (48061)  [] ES(attended) (53377)      [] ES (un) (44058):    ASSESSMENT:  See PN     GOALS:   Short Term Goals: To be achieved in: 2 weeks  1.  Independent in HEP and progression per patient tolerance, in order to prevent re-injury. [x]? Progressing: []? Met: []? Not Met: []? Adjusted       2. Patient will have a decrease in pain to facilitate improvement in movement, function, and ADLs as indicated by Functional Deficits. [x]? Progressing: []? Met: []? Not Met: []? Adjusted          Long Term Goals: To be achieved in: 12 weeks  1. Disability index score of 20% or less for the LEFS to assist with reaching prior level of function. [x]? Progressing: []? Met: []? Not Met: []? Adjusted      2. Patient will demonstrate increased AROM to equal the opposite side bilaterally to allow for proper joint functioning as indicated by patients Functional Deficits. [x]? Progressing: []? Met: []? Not Met: []? Adjusted       3. Patient will demonstrate an increase in strength to 5/5 to allow for proper functional mobility as indicated by patients Functional Deficits. [x]? Progressing: []? Met: []? Not Met: []? Adjusted       4. Patient will return to all transfers, work activities, and functional activities without increased symptoms or restriction. [x]? Progressing: []? Met: []? Not Met: []? Adjusted       5. Patient will have 0/10 pain with ADL's. [x]? Progressing: []? Met: []? Not Met: []? Adjusted       6. Patient stated goal: Patient wants to be able to walk and hike on uneven ground without AD x 30 minutes without pain or restriction to be able to return to prior level of function. [x]? Progressing: []? Met: []? Not Met: []? Adjusted                    (Cut /Paste from eval)    Overall Progression Towards Functional goals/ Treatment Progress Update:  [x] Patient is progressing as expected towards functional goals listed. [] Progression is slowed due to complexities/Impairments listed. [] Progression has been slowed due to co-morbidities.   [] Plan just implemented, too soon to assess goals progression <30days   [] Goals require adjustment due to lack of progress  [] Patient is not progressing as expected and requires additional follow up with physician  [] Other    Prognosis for POC: [x] Good [] Fair  [] Poor    Patient requires continued skilled intervention: [x] Yes  [] No    Treatment/Activity Tolerance:  [x] Patient able to complete treatment  [] Patient limited by fatigue  [] Patient limited by pain    [] Patient limited by other medical complications  [] Other:     Return to Play: (if applicable)   []  Stage 1: Intro to Strength   []  Stage 2: Return to Run and Strength   []  Stage 3: Return to Jump and Strength   []  Stage 4: Dynamic Strength and Agility   []  Stage 5: Sport Specific Training     []  Ready to Return to Play, Meets All Above Stages   [x]  Not Ready for Return to Sports   Comments:                         PLAN:   [x] Continue per plan of care [x] Alter current plan (see comments above)  [] Plan of care initiated [] Hold pending MD visit [] Discharge    Electronically signed by:  Tierra Azul PTA    Note: If patient does not return for scheduled/ recommended follow up visits, this note will serve as a discharge from care along with most recent update on progress.

## 2021-03-12 ENCOUNTER — HOSPITAL ENCOUNTER (OUTPATIENT)
Dept: PHYSICAL THERAPY | Age: 46
Setting detail: THERAPIES SERIES
Discharge: HOME | End: 2021-03-12
Payer: COMMERCIAL

## 2021-03-12 PROCEDURE — 97110 THERAPEUTIC EXERCISES: CPT

## 2021-03-12 PROCEDURE — 97112 NEUROMUSCULAR REEDUCATION: CPT

## 2021-03-12 PROCEDURE — 97140 MANUAL THERAPY 1/> REGIONS: CPT

## 2021-03-12 NOTE — FLOWSHEET NOTE
[facility], [address] [facility], [address] Therapy office  ([phone]) Fax [phone]     Physical Therapy Treatment Note/ Progress Report:     Date:  3/12/2021    Patient Name:  Benny Luz    :  1975  MRN: [id number]  Restrictions/Precautions:    Medical/Treatment Diagnosis Information:  · Diagnosis: s/p R ankle trimalleolar fracture / ORIF 20  · Treatment Diagnosis: M25.571, M25.674, E40.25  Insurance/Certification information:  PT Insurance Information: Jyothi  Physician Information:  Referring Practitioner: Elli Calivn  Has the plan of care been signed (Y/N):        []  Yes  [x]  No     Date of Patient follow up with Physician: 3/15/21    Is this a Progress Report:     []  Yes  [x]  No      If Yes:  Date Range for reporting period:  Initial Eval: 2020  Beginnin2020 --- Endin21  Beginnin21 -- Endin21  Beginning: 3/5/21 -- endin21    Progress report will be due (10 Rx or 30 days whichever is less): 63     Recertification will be due (POC Duration  / 90 days whichever is less):21      Visit # Insurance Allowable Auth Required   In Person  - 2021 -  30 []  Yes     []  No    [facility] 0  []  Yes     []  No    Total 18       Functional Scale: LEFS: 44% (Score: 46/80)   Date assessed: 2021  Functional Scale: LEFS: 29%     3/5/21    Latex Allergy:  [x]NO      []YES  Preferred Language for Healthcare:   [x]English       []other:    Pain level:  0.5/10      SUBJECTIVE: Patient reports he tripped getting out of his truck and landed hard, unexpectedly, on his R foot on Wednesday. He was sore for the rest of that day and the next, but is feeling better now. He has been going up/down steps independently at school. He is improving all the time, despite his incident on Wednesday. He will probably cancel his appointment with his MD next week because he is doing so well.     OBJECTIVE:    Observation:  Test measurements:  DF next to wall: Toe distance L: 5 in, R: 2 in 2/19/21  1/29/21 DF 6 EV 7    RESTRICTIONS/PRECAUTIONS: WBAT in boot on 1/18/21 progress to Los brace as tolerated per MD note     Exercises/Interventions:   Therapeutic Ex (49682)  Therapeutic Activity (39651)  NMR re-education (43573) Sets/Reps Notes/CUES   Elliptical 5' L5             HR / slantboard x20/ x15 up with two and down with one/ 3x30\" ea gastroc / soleus              HS Curl / Radha Mask  30# x30 / 20# x30    Mini Squat with HR  Mini squat x9  x10 airex    SLB with opp LE movement 5x15\"         Tandem squat  10x10\" ea     FSU  6\" x15 with a single leg hold    Step and hold 5\" x10 airex-forward  5\" x10 lateral - floor    DAVID abduction 20x 45#    Lateral step down 4\" 20x    Soleus pumps 10# x20    [place]          HR 4 walls with T-Band  Green x20 ea          Leg Press  100# x30 B / 80# x30 right only    Side steps with band    Bridges with SB                                   Towel stretch Inv/ev    BAPS (green ball) PF /DF and INV/EVR circles X20 / x20  x20 CW / x20 CCW         T-Band 4 way  At home           Sitting on SB with rolling mobs 4 ways on black foam wedge with manual assist                   Gait                Access code:[email or web address]               Manual Intervention (03192)     Moderate talocrural joint mob  Grade III 5' I   Light soft tissue heel 5'    CFM for PF      ROM                                   Patient Education         Therapeutic Exercise and NMR EXR  [x] (11032) Provided verbal/tactile cueing for activities related to strengthening, flexibility, endurance, ROM for improvements in LE, proximal hip, and core control with self care, mobility, lifting, ambulation.   [x] (34396) Provided verbal/tactile cueing for activities related to improving balance, coordination, kinesthetic sense, posture, motor skill, proprioception to assist with LE, proximal hip, and core control in self-care, mobility, lifting, ambulation and eccentric single leg control. NMR and Therapeutic Activities:    [x] (87898 or 25111) Provided verbal/tactile cueing for activities related to improving balance, coordination, kinesthetic sense, posture, motor skill, proprioception and motor activation to allow for proper function of core, proximal hip and LE with self-care and ADLs and functional mobility. [x] (74042) Gait Re-education- Provided training and instruction to the patient for proper LE, core and proximal hip recruitment and positioning and eccentric body weight control with ambulation re-education including up and down stairs     Home Exercise Program:    [x] (98154) Reviewed/Progressed HEP activities related to strengthening, flexibility, endurance, ROM of core, proximal hip and LE for functional self-care, mobility, lifting and ambulation/stair navigation   [x] (67674) Reviewed/Progressed HEP activities related to improving balance, coordination, kinesthetic sense, posture, motor skill, proprioception of core, proximal hip and LE for self-care, mobility, lifting, and ambulation/stair navigation      Manual Treatments:  PROM / STM / Oscillations-Mobs:  G-I, II, III, IV (PA's, Inf., Post.)  [x] (19099) Provided manual therapy to mobilize LE, proximal hip and/or LS spine soft tissue/joints for the purpose of modulating pain, promoting relaxation, increasing ROM, reducing/eliminating soft tissue swelling/inflammation/restriction, improving soft tissue extensibility and allowing for proper ROM for normal function with self-care, mobility, lifting and ambulation.      Modalities:   Charges:  Timed Code Treatment Minutes: 60   Total Treatment Minutes:  60   BWC:  TE TIME:  NMR TIME:  MANUAL TIME:  UNTIMED MINUTES:  Medicare Total:    n/a  n/a  n/a  n/a  n/a      [] EVAL (LOW) 44925 (typically 20 minutes face-to-face)  [] EVAL (MOD) 91080 (typically 30 minutes face-to-face)  [] EVAL (HIGH) 56631 (typically 45 minutes face-to-face)  [] RE-EVAL expected towards functional goals listed. [] Progression is slowed due to complexities/Impairments listed. [] Progression has been slowed due to co-morbidities. [] Plan just implemented, too soon to assess goals progression <30days   [] Goals require adjustment due to lack of progress  [] Patient is not progressing as expected and requires additional follow up with physician  [] Other    Prognosis for POC: [x] Good [] Fair  [] Poor    Patient requires continued skilled intervention: [x] Yes  [] No    Treatment/Activity Tolerance:  [x] Patient able to complete treatment  [] Patient limited by fatigue  [] Patient limited by pain    [] Patient limited by other medical complications  [] Other:     Return to Play: (if applicable)   []  Stage 1: Intro to Strength   []  Stage 2: Return to Run and Strength   []  Stage 3: Return to Jump and Strength   []  Stage 4: Dynamic Strength and Agility   []  Stage 5: Sport Specific Training     []  Ready to Return to Play, Meets All Above Stages   [x]  Not Ready for Return to Sports   Comments:                         PLAN:   [x] Continue per plan of care [x] Alter current plan (see comments above)  [] Plan of care initiated [] Hold pending MD visit [] Discharge    Electronically signed by:  Karolina Sheridan, PT, PTA    Note: If patient does not return for scheduled/ recommended follow up visits, this note will serve as a discharge from care along with most recent update on progress.

## 2021-03-16 ENCOUNTER — HOSPITAL ENCOUNTER (OUTPATIENT)
Dept: PHYSICAL THERAPY | Age: 46
Setting detail: THERAPIES SERIES
Discharge: HOME | End: 2021-03-16
Payer: COMMERCIAL

## 2021-03-16 PROCEDURE — 97112 NEUROMUSCULAR REEDUCATION: CPT | Performed by: PHYSICAL THERAPY ASSISTANT

## 2021-03-16 PROCEDURE — 97110 THERAPEUTIC EXERCISES: CPT | Performed by: PHYSICAL THERAPY ASSISTANT

## 2021-03-16 PROCEDURE — 97140 MANUAL THERAPY 1/> REGIONS: CPT | Performed by: PHYSICAL THERAPY ASSISTANT

## 2021-03-16 NOTE — FLOWSHEET NOTE
[facility], [address] [place] [address] Flo Chavarria [address], [address] Therapy office  ([phone]) Fax [phone]     Physical Therapy Treatment Note/ Progress Report:     Date:  3/16/2021    Patient Name:  Ginger Wing    :  1975  MRN: [id number]  Restrictions/Precautions:    Medical/Treatment Diagnosis Information:  · Diagnosis: s/p R ankle trimalleolar fracture / ORIF 20  · Treatment Diagnosis: M25.571, M25.674, S60.72  Insurance/Certification information:  PT Insurance Information: Jyothi  Physician Information:  Referring Practitioner: Diandra Barcenas  Has the plan of care been signed (Y/N):        []  Yes  [x]  No     Date of Patient follow up with Physician: 3/15/21    Is this a Progress Report:     []  Yes  [x]  No      If Yes:  Date Range for reporting period:  Initial Eval: 2020  Beginnin2020 --- Endin21  Beginnin21 -- Endin21  Beginning: 3/5/21 -- endin21    Progress report will be due (10 Rx or 30 days whichever is less):      Recertification will be due (POC Duration  / 90 days whichever is less):21      Visit # Insurance Allowable Auth Required   In Person  - 2021 -  30 []  Yes     []  No    [facility] 0  []  Yes     []  No    Total 20       Functional Scale: LEFS: 44% (Score: 46/80)   Date assessed: 2021  Functional Scale: LEFS: 29%     3/5/21    Latex Allergy:  [x]NO      []YES  Preferred Language for Healthcare:   [x]English       []other:    Pain level:  0.5/10      SUBJECTIVE: Patient reports that he is doing better but he is a little sore from being up on it at track practice. OBJECTIVE:    Observation:    Test measurements:  DF next to wall:  Toe distance L: 5 in, R: 2 in 21 DF 6 EV 7    RESTRICTIONS/PRECAUTIONS: WBAT in boot on 21 progress to Los brace as tolerated per MD note     Exercises/Interventions:   Therapeutic Ex (94612)  Therapeutic Activity (78654)  NMR re-education (61472) Sets/Reps Notes/CUES   Elliptical 5' L5             HR / slantboard x20/ x15 up with two and down with one/ 3x30\" ea gastroc / soleus              HS Curl / Abi Moore  40# x30 / 20# x30    Mini Squat with HR  Mini squat   x20 airex    SLB with opp LE movement 5 x 15\"         Tandem squat  5\" x10 ea     FSU  6\" x20 with a single leg hold    Step and hold 5\" x10 airex-forward  5\" x10 lateral - airex    DAVID abduction 30x 45#    Lateral step down 4\" x20    Soleus pumps 10# x30    Cable column 20# x10 ea side         HR 4 walls with T-Band  Green x10 ea          Leg Press  120# x30 B / 80# x30 right only    Side steps with band    Bridges with SB            Standing Airex with 1/2 moon inv/lili x20    Standing Airex with CCW/CW x15 ea                   Towel stretch Inv/ev    BAPS (green ball) PF /DF and INV/EVR circles X20 / x20  x20 CW / x20 CCW         T-Band 4 way  At home           Sitting on SB with rolling mobs 4 ways on black foam wedge with manual assist                   Gait                Access code:[email or web address]               Manual Intervention (90130)     Moderate talocrural joint mob  Grade III 5' I   Light soft tissue heel 5'    CFM for PF      ROM                                   Patient Education         Therapeutic Exercise and NMR EXR  [x] (01018) Provided verbal/tactile cueing for activities related to strengthening, flexibility, endurance, ROM for improvements in LE, proximal hip, and core control with self care, mobility, lifting, ambulation. [x] (00361) Provided verbal/tactile cueing for activities related to improving balance, coordination, kinesthetic sense, posture, motor skill, proprioception to assist with LE, proximal hip, and core control in self-care, mobility, lifting, ambulation and eccentric single leg control.      NMR and Therapeutic Activities:    [x] (04929 or 69934) Provided verbal/tactile cueing for activities related to improving balance, coordination, kinesthetic sense, posture, motor skill, proprioception and motor activation to allow for proper function of core, proximal hip and LE with self-care and ADLs and functional mobility. [x] (29327) Gait Re-education- Provided training and instruction to the patient for proper LE, core and proximal hip recruitment and positioning and eccentric body weight control with ambulation re-education including up and down stairs     Home Exercise Program:    [x] (74641) Reviewed/Progressed HEP activities related to strengthening, flexibility, endurance, ROM of core, proximal hip and LE for functional self-care, mobility, lifting and ambulation/stair navigation   [x] (43273) Reviewed/Progressed HEP activities related to improving balance, coordination, kinesthetic sense, posture, motor skill, proprioception of core, proximal hip and LE for self-care, mobility, lifting, and ambulation/stair navigation      Manual Treatments:  PROM / STM / Oscillations-Mobs:  G-I, II, III, IV (PA's, Inf., Post.)  [x] (41481) Provided manual therapy to mobilize LE, proximal hip and/or LS spine soft tissue/joints for the purpose of modulating pain, promoting relaxation, increasing ROM, reducing/eliminating soft tissue swelling/inflammation/restriction, improving soft tissue extensibility and allowing for proper ROM for normal function with self-care, mobility, lifting and ambulation.      Modalities:   Charges:  Timed Code Treatment Minutes: 60   Total Treatment Minutes:  60   BWC:  TE TIME:  NMR TIME:  MANUAL TIME:  UNTIMED MINUTES:  Medicare Total:    n/a  n/a  n/a  n/a  n/a      [] EVAL (LOW) 19006 (typically 20 minutes face-to-face)  [] EVAL (MOD) 60367 (typically 30 minutes face-to-face)  [] EVAL (HIGH) 97944 (typically 45 minutes face-to-face)  [] RE-EVAL     [x] WK(84982) x  2   [] IONTO  [x] NMR (00802) x1     [] VASO  [x] Manual (29025) x1     [] Other: gait  [] TA x      [] Mech Traction (00460)  [] ES(attended) (19758)      [] ES (un) (63496):    ASSESSMENT:  See PN     GOALS:   Short Term Goals: To be achieved in: 2 weeks  1. Independent in HEP and progression per patient tolerance, in order to prevent re-injury. [x]? Progressing: []? Met: []? Not Met: []? Adjusted       2. Patient will have a decrease in pain to facilitate improvement in movement, function, and ADLs as indicated by Functional Deficits. [x]? Progressing: []? Met: []? Not Met: []? Adjusted          Long Term Goals: To be achieved in: 12 weeks  1. Disability index score of 20% or less for the LEFS to assist with reaching prior level of function. [x]? Progressing: []? Met: []? Not Met: []? Adjusted      2. Patient will demonstrate increased AROM to equal the opposite side bilaterally to allow for proper joint functioning as indicated by patients Functional Deficits. [x]? Progressing: []? Met: []? Not Met: []? Adjusted       3. Patient will demonstrate an increase in strength to 5/5 to allow for proper functional mobility as indicated by patients Functional Deficits. [x]? Progressing: []? Met: []? Not Met: []? Adjusted       4. Patient will return to all transfers, work activities, and functional activities without increased symptoms or restriction. [x]? Progressing: []? Met: []? Not Met: []? Adjusted       5. Patient will have 0/10 pain with ADL's. [x]? Progressing: []? Met: []? Not Met: []? Adjusted       6. Patient stated goal: Patient wants to be able to walk and hike on uneven ground without AD x 30 minutes without pain or restriction to be able to return to prior level of function. [x]? Progressing: []? Met: []? Not Met: []? Adjusted                    (Cut /Paste from eval)    Overall Progression Towards Functional goals/ Treatment Progress Update:  [x] Patient is progressing as expected towards functional goals listed. [] Progression is slowed due to complexities/Impairments listed. [] Progression has been slowed due to co-morbidities.   [] Plan just implemented, too soon to assess goals progression <30days   [] Goals require adjustment due to lack of progress  [] Patient is not progressing as expected and requires additional follow up with physician  [] Other    Prognosis for POC: [x] Good [] Fair  [] Poor    Patient requires continued skilled intervention: [x] Yes  [] No    Treatment/Activity Tolerance:  [x] Patient able to complete treatment  [] Patient limited by fatigue  [] Patient limited by pain    [] Patient limited by other medical complications  [] Other:     Return to Play: (if applicable)   []  Stage 1: Intro to Strength   []  Stage 2: Return to Run and Strength   []  Stage 3: Return to Jump and Strength   []  Stage 4: Dynamic Strength and Agility   []  Stage 5: Sport Specific Training     []  Ready to Return to Play, Meets All Above Stages   [x]  Not Ready for Return to Sports   Comments:                         PLAN:   [x] Continue per plan of care [x] Alter current plan (see comments above)  [] Plan of care initiated [] Hold pending MD visit [] Discharge    Electronically signed by:  Markie Jacobo PTA    Note: If patient does not return for scheduled/ recommended follow up visits, this note will serve as a discharge from care along with most recent update on progress.

## 2021-03-23 ENCOUNTER — HOSPITAL ENCOUNTER (OUTPATIENT)
Dept: PHYSICAL THERAPY | Age: 46
Setting detail: THERAPIES SERIES
Discharge: HOME | End: 2021-03-23
Payer: COMMERCIAL

## 2021-03-23 PROCEDURE — 97110 THERAPEUTIC EXERCISES: CPT | Performed by: PHYSICAL THERAPY ASSISTANT

## 2021-03-23 PROCEDURE — 97112 NEUROMUSCULAR REEDUCATION: CPT | Performed by: PHYSICAL THERAPY ASSISTANT

## 2021-03-23 NOTE — FLOWSHEET NOTE
[facility], [address] [place] [address] Mars Chavar[address], [address] Therapy office  ([phone]) Fax [phone]     Physical Therapy Treatment Note/ Progress Report:     Date:  3/23/2021    Patient Name:  Wood Greco    :  1975  MRN: [id number]  Restrictions/Precautions:    Medical/Treatment Diagnosis Information:  · Diagnosis: s/p R ankle trimalleolar fracture / ORIF 20  · Treatment Diagnosis: M25.571, M25.674, T05.00  Insurance/Certification information:  PT Insurance Information: Jyothi  Physician Information:  Referring Practitioner: Chelsea Arredondo  Has the plan of care been signed (Y/N):        []  Yes  [x]  No     Date of Patient follow up with Physician: 3/15/21    Is this a Progress Report:     []  Yes  [x]  No      If Yes:  Date Range for reporting period:  Initial Eval: 2020  Beginnin2020 --- Endin21  Beginnin21 -- Endin21  Beginning: 3/5/21 -- endin21    Progress report will be due (10 Rx or 30 days whichever is less): 75     Recertification will be due (POC Duration  / 90 days whichever is less):21      Visit # Insurance Allowable Auth Required   In Person  - 2021 30 []  Yes     []  No    [facility] 0  []  Yes     []  No    Total 21       Functional Scale: LEFS: 44% (Score: 46/80)   Date assessed: 2021  Functional Scale: LEFS: 29%     3/5/21    Latex Allergy:  [x]NO      []YES  Preferred Language for Healthcare:   [x]English       []other:    Pain level:  0.5/10      SUBJECTIVE: Doing well. Has been a little more sore as he has been in a field and on some uneven surfaces. Not painful, just sore from different surfaces. OBJECTIVE:    Observation:    Test measurements:  DF next to wall:  Toe distance L: 5 in, R: 2 in 21 DF 6 EV 7    RESTRICTIONS/PRECAUTIONS: WBAT in boot on 21 progress to Los brace as tolerated per MD note     Exercises/Interventions:   Therapeutic Ex (79276)  Therapeutic Activity (97902)  NMR re-education (71701) Sets/Reps Notes/CUES   Elliptical 5' L5             HR / slantboard x20/ x15 up with two and down with one/ 3x30\" ea gastroc / soleus              HS Curl / Willa Rehan  40# x30 / 20# x30    Mini Squat with HR  Mini squat   x20 airex    SLB with opp LE movement 5 x 15\"         Tandem squat  5\" x10 ea     FSU  6\" x20 with a single leg hold    Step and hold 5\" x10 airex-forward  5\" x10 lateral - airex    DAVID abduction 30x 45#    Lateral step down 4\" x20    Soleus pumps 10# x30    Cable column 20# x10 ea side         HR 4 walls with T-Band  Green x10 ea          Leg Press  120# x30 B / 80# x30 right only    Side steps with band    Bridges with SB            Standing Airex with 1/2 moon inv/lili x15    Standing Airex with CCW/CW                    Towel stretch Inv/ev    BAPS (green ball) PF /DF and INV/EVR circles X20 / x20  x20 CW / x20 CCW         T-Band 4 way  At home           Sitting on SB with rolling mobs 4 ways on black foam wedge with manual assist                   Gait                Access code:[email or web address]               Manual Intervention (17965)     Moderate talocrural joint mob  Grade III 5' I   Light soft tissue heel 5'    CFM for PF      ROM                                   Patient Education         Therapeutic Exercise and NMR EXR  [x] (44239) Provided verbal/tactile cueing for activities related to strengthening, flexibility, endurance, ROM for improvements in LE, proximal hip, and core control with self care, mobility, lifting, ambulation. [x] (57479) Provided verbal/tactile cueing for activities related to improving balance, coordination, kinesthetic sense, posture, motor skill, proprioception to assist with LE, proximal hip, and core control in self-care, mobility, lifting, ambulation and eccentric single leg control.      NMR and Therapeutic Activities:    [x] (54159 or 06605) Provided verbal/tactile cueing for activities related to improving balance, coordination, kinesthetic sense, posture, motor skill, proprioception and motor activation to allow for proper function of core, proximal hip and LE with self-care and ADLs and functional mobility. [x] (03045) Gait Re-education- Provided training and instruction to the patient for proper LE, core and proximal hip recruitment and positioning and eccentric body weight control with ambulation re-education including up and down stairs     Home Exercise Program:    [x] (43612) Reviewed/Progressed HEP activities related to strengthening, flexibility, endurance, ROM of core, proximal hip and LE for functional self-care, mobility, lifting and ambulation/stair navigation   [x] (71038) Reviewed/Progressed HEP activities related to improving balance, coordination, kinesthetic sense, posture, motor skill, proprioception of core, proximal hip and LE for self-care, mobility, lifting, and ambulation/stair navigation      Manual Treatments:  PROM / STM / Oscillations-Mobs:  G-I, II, III, IV (PA's, Inf., Post.)  [x] (82054) Provided manual therapy to mobilize LE, proximal hip and/or LS spine soft tissue/joints for the purpose of modulating pain, promoting relaxation, increasing ROM, reducing/eliminating soft tissue swelling/inflammation/restriction, improving soft tissue extensibility and allowing for proper ROM for normal function with self-care, mobility, lifting and ambulation.      Modalities:   Charges:  Timed Code Treatment Minutes: 60   Total Treatment Minutes:  60   BWC:  TE TIME:  NMR TIME:  MANUAL TIME:  UNTIMED MINUTES:  Medicare Total:    n/a  n/a  n/a  n/a  n/a      [] EVAL (LOW) 60079 (typically 20 minutes face-to-face)  [] EVAL (MOD) 36625 (typically 30 minutes face-to-face)  [] EVAL (HIGH) 67870 (typically 45 minutes face-to-face)  [] RE-EVAL     [x] QO(07970) x  2   [] IONTO  [x] NMR (64931) x2     [] VASO  [] Manual (56963) x     [] Other: gait  [] TA x      [] Mech Traction (20154)  [] ES(attended) co-morbidities. [] Plan just implemented, too soon to assess goals progression <30days   [] Goals require adjustment due to lack of progress  [] Patient is not progressing as expected and requires additional follow up with physician  [] Other    Prognosis for POC: [x] Good [] Fair  [] Poor    Patient requires continued skilled intervention: [x] Yes  [] No    Treatment/Activity Tolerance:  [x] Patient able to complete treatment  [] Patient limited by fatigue  [] Patient limited by pain    [] Patient limited by other medical complications  [] Other:     Return to Play: (if applicable)   []  Stage 1: Intro to Strength   []  Stage 2: Return to Run and Strength   []  Stage 3: Return to Jump and Strength   []  Stage 4: Dynamic Strength and Agility   []  Stage 5: Sport Specific Training     []  Ready to Return to Play, Meets All Above Stages   [x]  Not Ready for Return to Sports   Comments:                         PLAN:   [x] Continue per plan of care [] Alter current plan (see comments above)  [] Plan of care initiated [] Hold pending MD visit [] Discharge    Electronically signed by:  Lay Douglas PTA    Note: If patient does not return for scheduled/ recommended follow up visits, this note will serve as a discharge from care along with most recent update on progress.

## 2021-03-30 ENCOUNTER — APPOINTMENT (OUTPATIENT)
Dept: PHYSICAL THERAPY | Age: 46
End: 2021-03-30
Payer: COMMERCIAL

## 2021-04-01 ENCOUNTER — HOSPITAL ENCOUNTER (OUTPATIENT)
Dept: PHYSICAL THERAPY | Age: 46
Setting detail: THERAPIES SERIES
Discharge: HOME | End: 2021-04-01
Payer: COMMERCIAL

## 2021-04-01 PROCEDURE — 97530 THERAPEUTIC ACTIVITIES: CPT | Performed by: PHYSICAL THERAPY ASSISTANT

## 2021-04-01 PROCEDURE — 97110 THERAPEUTIC EXERCISES: CPT | Performed by: PHYSICAL THERAPY ASSISTANT

## 2021-04-01 PROCEDURE — 97112 NEUROMUSCULAR REEDUCATION: CPT | Performed by: PHYSICAL THERAPY ASSISTANT

## 2021-04-01 NOTE — FLOWSHEET NOTE
__________________________________________________    Physician Signature:____________________________________ Date:____________  By signing above, therapists plan is approved by physician    If you have any questions or concerns, please don't hesitate to call.   Thank you for your referral.    [address] [address] office  ([phone])     Fax [phone]         Physical Therapy Treatment Note/ Progress Report:     Date:  2021    Patient Name:  Yanely Mixon    :  1975  MRN: [id number]  Restrictions/Precautions:    Medical/Treatment Diagnosis Information:  · Diagnosis: s/p R ankle trimalleolar fracture / ORIF 20  · Treatment Diagnosis: M25.571, M25.674, N22.91  Insurance/Certification information:  PT Insurance Information: Jyothi  Physician Information:  Referring Practitioner: Marilee Modi  Has the plan of care been signed (Y/N):        []  Yes  [x]  No     Date of Patient follow up with Physician: 3/15/21    Is this a Progress Report:     []  Yes  [x]  No      If Yes:  Date Range for reporting period:  Initial Eval: 2020  Beginnin2020 --- Endin21  Beginnin21 -- Endin21  Beginning: 3/5/21 -- endin84    Recert  Beginning: 3/4/53 -- Ending 21    Progress report will be due (10 Rx or 30 days whichever is less):      Recertification will be due (POC Duration  / 90 days whichever is less):21      Visit # Insurance Allowable Auth Required   In Person 2021 30 []  Yes     []  No    [facility] 0  []  Yes     []  No    Total 2021       Functional Scale: LEFS: 44% (Score: 46/80)   Date assessed: 2021  Functional Scale: LEFS: 29%     3/5/21  Functional Scale: LEFS: 33%      21    Latex Allergy:  [x]NO      []YES  Preferred Language for Healthcare:   [x]English       []other:    Pain level:  0.5/10      SUBJECTIVE:  \"Ankle has elvia been painful actually\" (pointing to achilles tendon) Notes pain is worse by end of day, gone by morning - states he is icing only when he needs to. OBJECTIVE:    Observation:    Test measurements:  DF next to wall: Toe distance L: 5 in, R: 2 in 2/19/21  1/29/21 DF 6 EV 7    RESTRICTIONS/PRECAUTIONS: WBAT in boot on 1/18/21 progress to Los brace as tolerated per MD note     Exercises/Interventions:   Therapeutic Ex (67697)  Therapeutic Activity (86018)  NMR re-education (92124) Sets/Reps Notes/CUES   Elliptical 5' L5             HR / slantboard x20/ x15 up with two and down with one/ 3x30\" Renetta Minder / soleus              HS Curl / Thor Floro  40# x30 / 20# x30    Mini Squat with HR  Mini squat   x20 airex    SLB with opp LE movement 5 x 15\"         Tandem squat  5\" x15 ea     FSU  6\" x30 with a single leg hold    Step and hold 5\" x10 airex-forward  5\" x10 lateral - airex    DAVID abduction 45# x30    Lateral step down 4\" x20    Soleus pumps 20# x30    Cable column 20# x10 ea side         HR 4 walls with T-Band  Green x10 ea          Leg Press  120# x30 B / 80# x30 right only    Side steps with band    Bridges with SB            Standing Airex with 1/2 moon inv/lili x15    Standing Airex with CCW/CW                    Towel stretch Inv/ev    BAPS (green ball) PF /DF and INV/EVR circles X20 / x20  x20 CW / x20 CCW         T-Band 4 way  At home           Sitting on SB with rolling mobs 4 ways on black foam wedge with manual assist                   Gait                Access code:[email or web address]               Manual Intervention (22607)     Moderate talocrural joint mob  Grade III 5' I   Light soft tissue heel 5'    CFM for PF      ROM                                   Patient Education         Therapeutic Exercise and NMR EXR  [x] (10537) Provided verbal/tactile cueing for activities related to strengthening, flexibility, endurance, ROM for improvements in LE, proximal hip, and core control with self care, mobility, lifting, ambulation.   [x] (19160) Provided verbal/tactile cueing for activities related to improving balance, coordination, kinesthetic sense, posture, motor skill, proprioception to assist with LE, proximal hip, and core control in self-care, mobility, lifting, ambulation and eccentric single leg control. NMR and Therapeutic Activities:    [x] (50974 or 11563) Provided verbal/tactile cueing for activities related to improving balance, coordination, kinesthetic sense, posture, motor skill, proprioception and motor activation to allow for proper function of core, proximal hip and LE with self-care and ADLs and functional mobility. [x] (41202) Gait Re-education- Provided training and instruction to the patient for proper LE, core and proximal hip recruitment and positioning and eccentric body weight control with ambulation re-education including up and down stairs     Home Exercise Program:    [x] (94264) Reviewed/Progressed HEP activities related to strengthening, flexibility, endurance, ROM of core, proximal hip and LE for functional self-care, mobility, lifting and ambulation/stair navigation   [x] (05601) Reviewed/Progressed HEP activities related to improving balance, coordination, kinesthetic sense, posture, motor skill, proprioception of core, proximal hip and LE for self-care, mobility, lifting, and ambulation/stair navigation      Manual Treatments:  PROM / STM / Oscillations-Mobs:  G-I, II, III, IV (PA's, Inf., Post.)  [x] (22403) Provided manual therapy to mobilize LE, proximal hip and/or LS spine soft tissue/joints for the purpose of modulating pain, promoting relaxation, increasing ROM, reducing/eliminating soft tissue swelling/inflammation/restriction, improving soft tissue extensibility and allowing for proper ROM for normal function with self-care, mobility, lifting and ambulation.      Modalities:   Charges:  Timed Code Treatment Minutes: 60   Total Treatment Minutes:  60   BWC:  TE TIME:  NMR TIME:  MANUAL TIME:  UNTIMED MINUTES:  Medicare Total:    n/a  n/a  n/a  n/a  n/a      [] level of function. [x]? Progressing: []? Met: []? Not Met: []? Adjusted                    (Cut /Paste from eval)    Overall Progression Towards Functional goals/ Treatment Progress Update:  [x] Patient is progressing as expected towards functional goals listed. [] Progression is slowed due to complexities/Impairments listed. [] Progression has been slowed due to co-morbidities. [] Plan just implemented, too soon to assess goals progression <30days   [] Goals require adjustment due to lack of progress  [] Patient is not progressing as expected and requires additional follow up with physician  [] Other    Prognosis for POC: [x] Good [] Fair  [] Poor    Patient requires continued skilled intervention: [x] Yes  [] No    Treatment/Activity Tolerance:  [x] Patient able to complete treatment  [] Patient limited by fatigue  [] Patient limited by pain    [] Patient limited by other medical complications  [] Other:     Return to Play: (if applicable)   []  Stage 1: Intro to Strength   []  Stage 2: Return to Run and Strength   []  Stage 3: Return to Jump and Strength   []  Stage 4: Dynamic Strength and Agility   []  Stage 5: Sport Specific Training     []  Ready to Return to Play, Meets All Above Stages   [x]  Not Ready for Return to Sports   Comments:                         PLAN:   [x] Continue per plan of care [] Alter current plan (see comments above)  [] Plan of care initiated [] Hold pending MD visit [] Discharge    Electronically signed by:  Destinee Holman PTA, Gregg Mark PT, MPT,ATC  Note: If patient does not return for scheduled/ recommended follow up visits, this note will serve as a discharge from care along with most recent update on progress.

## 2021-04-06 ENCOUNTER — HOSPITAL ENCOUNTER (OUTPATIENT)
Dept: PHYSICAL THERAPY | Age: 46
Setting detail: THERAPIES SERIES
Discharge: HOME | End: 2021-04-06
Payer: COMMERCIAL

## 2021-04-06 NOTE — FLOWSHEET NOTE
[address], [facility] ([facility])    Physical Therapy  Cancellation/No-show Note  Patient Name:  Leslie Lao  :  1975   Date:  2021    Cancelled visits to date: 2  No-shows to date: 0    For today's appointment patient:  [x]  Cancelled  []  Rescheduled appointment  []  No-show     Reason given by patient:  []  Patient ill  []  Conflicting appointment  []  No transportation    []  Conflict with work  []  No reason given  [x]  Other:     Comments: Too many other things to do today    Phone call information:   []  Phone call made today to patient. []  Patient answered, conversation as follows:    []  Patient did not answer. []  Phone call not made today  [x]  Phone call not needed - pt contacted us to cancel and provided reason for cancellation.      Electronically signed by:  Justyn Cook PTA

## 2021-04-16 ENCOUNTER — HOSPITAL ENCOUNTER (OUTPATIENT)
Dept: PHYSICAL THERAPY | Age: 46
Setting detail: THERAPIES SERIES
Discharge: HOME | End: 2021-04-16
Payer: COMMERCIAL

## 2021-04-16 PROCEDURE — 97140 MANUAL THERAPY 1/> REGIONS: CPT

## 2021-04-16 PROCEDURE — 97110 THERAPEUTIC EXERCISES: CPT

## 2021-04-16 PROCEDURE — 97112 NEUROMUSCULAR REEDUCATION: CPT

## 2021-04-16 PROCEDURE — 97530 THERAPEUTIC ACTIVITIES: CPT

## 2021-04-16 NOTE — FLOWSHEET NOTE
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  Insurance/Certification information:  PT Insurance Information: [employer]  Physician Information:  Referring Practitioner: Bella Holter  Has the plan of care been signed (Y/N):        []  Yes  [x]  No     Date of Patient follow up with Physician: 3/15/21    Is this a Progress Report:     []  Yes  [x]  No      If Yes:  Date Range for reporting period:  Initial Eval: 2020  Beginnin2020 --- Endin21  Beginnin21 -- Endin21  Beginning: 3/5/21 -- endin87    Recert  Beginnin18 -- Ending 21    Progress report will be due (10 Rx or 30 days whichever is less):      Recertification will be due (POC Duration  / 90 days whichever is less):21      Visit # Insurance Allowable Auth Required   In Person 2021 30 []  Yes     []  No    [facility] 0  []  Yes     []  No    Total 2021       Functional Scale: LEFS: 44% (Score: 46/80)   Date assessed: 2021  Functional Scale: LEFS: 29%     3/5/21  Functional Scale: LEFS: 33%      21    Latex Allergy:  [x]NO      []YES  Preferred Language for Healthcare:   [x]English       []other:    Pain level:  0.5/10      SUBJECTIVE:  Patient reports he is having some intermittent pain (mostly Wednesday) on the outside of the foot (points to the lateral aspect of the foot behind the malleolus), but that is probably because he was lifting a lot of heavy hurdles and carrying them across the field on Tuesday. Today he isn't having pain.  Patient hoping to follow-up with MD next week and will call after that. OBJECTIVE:    Observation:    Test measurements:  DF next to wall: Toe distance L: 5 in, R: 2.75 in 4/16/21  1/29/21 DF 6 EV 7    RESTRICTIONS/PRECAUTIONS: WBAT in boot on 1/18/21 progress to Los brace as tolerated per MD note     Exercises/Interventions:   Therapeutic Ex (60452)  Therapeutic Activity (60882)  NMR re-education (97631) Sets/Reps Notes/CUES   Elliptical 5' L5             HR / slantboard x20/ x15 up with two and down with one/ 3x30\" ea gastroc / soleus    Kneeling DF stretch 3x30\"         HS Curl / Gabriela Half  40# x30 / 20# x30    Mini Squat with HR  Mini squat   x20 BOSU    SLB with opp LE movement 5 x 15\"         Tandem squat  5\" x15 ea     FSU  6\" x30 with a single leg hold    Step and hold 5\" x10 airex-forward  5\" x10 lateral - airex    DAVID abduction 45# x30    Forward step down 4\" x20    Soleus pumps 20# x30    Cable column 20# x10 ea side         HR 4 walls with T-Band  Green x10 ea          Leg Press  120# x30 B / 80# x30 right only  # B    Side steps with band    Bridges with SB            Standing Airex with 1/2 moon inv/lili x15    Standing Airex with CCW/CW     Carioca 2 laps              Towel stretch Inv/ev    BAPS (green ball) PF /DF and INV/EVR circles X20 / x20  x20 CW / x20 CCW         T-Band 4 way  At home           Sitting on SB with rolling mobs 4 ways on black foam wedge with manual assist                   Gait                Access code:[email or web address]               Manual Intervention (07699)     Moderate talocrural joint mob  Grade III 5' I   Light soft tissue heel 5'    CFM for PF      ROM                                   Patient Education         Therapeutic Exercise and NMR EXR  [x] (53419) Provided verbal/tactile cueing for activities related to strengthening, flexibility, endurance, ROM for improvements in LE, proximal hip, and core control with self care, mobility, lifting, ambulation.   [x] (55038) Provided verbal/tactile cueing for activities related to improving balance, coordination, kinesthetic sense, posture, motor skill, proprioception to assist with LE, proximal hip, and core control in self-care, mobility, lifting, ambulation and eccentric single leg control. NMR and Therapeutic Activities:    [x] (52535 or 74083) Provided verbal/tactile cueing for activities related to improving balance, coordination, kinesthetic sense, posture, motor skill, proprioception and motor activation to allow for proper function of core, proximal hip and LE with self-care and ADLs and functional mobility. [x] (11357) Gait Re-education- Provided training and instruction to the patient for proper LE, core and proximal hip recruitment and positioning and eccentric body weight control with ambulation re-education including up and down stairs     Home Exercise Program:    [x] (13828) Reviewed/Progressed HEP activities related to strengthening, flexibility, endurance, ROM of core, proximal hip and LE for functional self-care, mobility, lifting and ambulation/stair navigation   [x] (77716) Reviewed/Progressed HEP activities related to improving balance, coordination, kinesthetic sense, posture, motor skill, proprioception of core, proximal hip and LE for self-care, mobility, lifting, and ambulation/stair navigation      Manual Treatments:  PROM / STM / Oscillations-Mobs:  G-I, II, III, IV (PA's, Inf., Post.)  [x] (69770) Provided manual therapy to mobilize LE, proximal hip and/or LS spine soft tissue/joints for the purpose of modulating pain, promoting relaxation, increasing ROM, reducing/eliminating soft tissue swelling/inflammation/restriction, improving soft tissue extensibility and allowing for proper ROM for normal function with self-care, mobility, lifting and ambulation.      Modalities:   Charges:  Timed Code Treatment Minutes: 60   Total Treatment Minutes:  60   BWC:  TE TIME:  NMR TIME:  MANUAL TIME:  UNTIMED MINUTES:  Medicare Total: n/a  n/a  n/a  n/a  n/a      [] EVAL (LOW) 99316 (typically 20 minutes face-to-face)  [] EVAL (MOD) 63514 (typically 30 minutes face-to-face)  [] EVAL (HIGH) 89472 (typically 45 minutes face-to-face)  [] RE-EVAL     [x] EQ(96175) x  2   [] IONTO  [x] NMR (12462) x1     [] VASO  [] Manual (53676) x     [] Other: gait  [x] TA x 1     [] Mech Traction (25110)  [] ES(attended) (19653)      [] ES (un) (84774):    ASSESSMENT:  Increased challenge on BOSU and with therex. He tolerated all exercises well and just reported \"stretching pain. \"    GOALS:   Short Term Goals: To be achieved in: 2 weeks  1. Independent in HEP and progression per patient tolerance, in order to prevent re-injury. []? Progressing: [x]? Met: []? Not Met: []? Adjusted       2. Patient will have a decrease in pain to facilitate improvement in movement, function, and ADLs as indicated by Functional Deficits. []? Progressing: [x]? Met: []? Not Met: []? Adjusted          Long Term Goals: To be achieved in: 12 weeks  1. Disability index score of 20% or less for the LEFS to assist with reaching prior level of function. [x]? Progressing: []? Met: []? Not Met: []? Adjusted      2. Patient will demonstrate increased AROM to equal the opposite side bilaterally to allow for proper joint functioning as indicated by patients Functional Deficits. [x]? Progressing: []? Met: []? Not Met: []? Adjusted       3. Patient will demonstrate an increase in strength to 5/5 to allow for proper functional mobility as indicated by patients Functional Deficits. [x]? Progressing: []? Met: []? Not Met: []? Adjusted       4. Patient will return to all transfers, work activities, and functional activities without increased symptoms or restriction. [x]? Progressing: []? Met: []? Not Met: []? Adjusted       5. Patient will have 0/10 pain with ADL's. [x]? Progressing: []? Met: []? Not Met: []? Adjusted       6.  Patient stated goal: Patient wants to be able to walk and hike on uneven ground without AD x 30 minutes without pain or restriction to be able to return to prior level of function. [x]? Progressing: []? Met: []? Not Met: []? Adjusted                    (Cut /Paste from eval)    Overall Progression Towards Functional goals/ Treatment Progress Update:  [x] Patient is progressing as expected towards functional goals listed. [] Progression is slowed due to complexities/Impairments listed. [] Progression has been slowed due to co-morbidities. [] Plan just implemented, too soon to assess goals progression <30days   [] Goals require adjustment due to lack of progress  [] Patient is not progressing as expected and requires additional follow up with physician  [] Other    Prognosis for POC: [x] Good [] Fair  [] Poor    Patient requires continued skilled intervention: [x] Yes  [] No    Treatment/Activity Tolerance:  [x] Patient able to complete treatment  [] Patient limited by fatigue  [] Patient limited by pain    [] Patient limited by other medical complications  [] Other:     Return to Play: (if applicable)   []  Stage 1: Intro to Strength   []  Stage 2: Return to Run and Strength   []  Stage 3: Return to Jump and Strength   []  Stage 4: Dynamic Strength and Agility   []  Stage 5: Sport Specific Training     []  Ready to Return to Play, Meets All Above Stages   [x]  Not Ready for Return to Sports   Comments:                         PLAN:   [x] Continue per plan of care [] Alter current plan (see comments above)  [] Plan of care initiated [] Hold pending MD visit [] Discharge    Electronically signed by:  Luann Hernandez, PT, PTAKirit Do PT, MPT,ATC  Note: If patient does not return for scheduled/ recommended follow up visits, this note will serve as a discharge from care along with most recent update on progress.

## 2022-03-21 ENCOUNTER — HOSPITAL ENCOUNTER (OUTPATIENT)
Age: 47
Discharge: HOME | End: 2022-03-21
Payer: COMMERCIAL

## 2022-03-21 ENCOUNTER — HOSPITAL ENCOUNTER (OUTPATIENT)
Dept: GENERAL RADIOLOGY | Age: 47
Discharge: HOME | End: 2022-03-21
Payer: COMMERCIAL

## 2022-03-21 DIAGNOSIS — R10.9 ABDOMINAL PAIN, UNSPECIFIED ABDOMINAL LOCATION: ICD-10-CM

## 2022-03-21 DIAGNOSIS — N20.0 CALCULUS OF KIDNEY: ICD-10-CM

## 2022-03-21 PROCEDURE — 74018 RADEX ABDOMEN 1 VIEW: CPT

## 2022-03-26 ENCOUNTER — HOSPITAL ENCOUNTER (OUTPATIENT)
Age: 47
Discharge: HOME | End: 2022-03-26
Payer: COMMERCIAL

## 2022-03-26 LAB — ESTRADIOL LEVEL: 17 PG/ML

## 2022-03-26 PROCEDURE — 84403 ASSAY OF TOTAL TESTOSTERONE: CPT

## 2022-03-26 PROCEDURE — 82670 ASSAY OF TOTAL ESTRADIOL: CPT

## 2022-03-30 LAB — TESTOSTERONE TOTAL: 259 NG/DL (ref 220–1000)

## 2022-04-02 ENCOUNTER — HOSPITAL ENCOUNTER (OUTPATIENT)
Age: 47
Discharge: HOME | End: 2022-04-02
Payer: COMMERCIAL

## 2022-04-02 ENCOUNTER — HOSPITAL ENCOUNTER (OUTPATIENT)
Dept: GENERAL RADIOLOGY | Age: 47
Discharge: HOME | End: 2022-04-02
Payer: COMMERCIAL

## 2022-04-02 DIAGNOSIS — R10.9 ABDOMINAL PAIN, UNSPECIFIED ABDOMINAL LOCATION: ICD-10-CM

## 2022-04-02 DIAGNOSIS — N20.0 CALCULUS OF KIDNEY: ICD-10-CM

## 2022-04-02 PROCEDURE — 74018 RADEX ABDOMEN 1 VIEW: CPT

## (undated) DEVICE — PADDING UNDERCAST W4INXL4YD 100% COT CRIMPED FINISH WBRL II

## (undated) DEVICE — PAD,ABDOMINAL,8"X10",ST,LF: Brand: MEDLINE

## (undated) DEVICE — PACK EXTREMITY XR

## (undated) DEVICE — DRESSING,GAUZE,XEROFORM,CURAD,5"X9",ST: Brand: CURAD

## (undated) DEVICE — SOLUTION IV IRRIG POUR BRL 0.9% SODIUM CHL 2F7124

## (undated) DEVICE — BIT DRL L140MM DIA2MM QUIK CPL 3 FLUT CALIB DEPTH MRK W/O

## (undated) DEVICE — SPLINT THMB W4XL30IN FBRGLS PD PRECUT LTWT DURABLE FAST SET

## (undated) DEVICE — CUSTOM PACK: Brand: UNBRANDED

## (undated) DEVICE — STAPLER EXT SKIN 35 WIDE S STL STPL SQUEEZE HNDL VISISTAT

## (undated) DEVICE — GOWN SIRUS NONREIN XL W/TWL: Brand: MEDLINE INDUSTRIES, INC.

## (undated) DEVICE — GUIDEWIRE ORTH L150MM DIA1.25MM S STL THRD FOR 4MM CANN SCR

## (undated) DEVICE — GAUZE,SPONGE,4"X4",8PLY,STRL,LF,10/TRAY: Brand: MEDLINE

## (undated) DEVICE — BIT DRL L160MM DIA2.7MM CANN QUIK CPL ADJ STP REUSE FOR

## (undated) DEVICE — SUTURE ETHLN SZ 3-0 L30IN NONABSORBABLE BLK FSL L30MM 3/8 1671H

## (undated) DEVICE — BIT DRL L125MM DIA2.7MM QUIK CPL 3 FLUT

## (undated) DEVICE — COTTON UNDERCAST PADDING,CRIMPED FINISH: Brand: WEBRIL

## (undated) DEVICE — SHEET,DRAPE,53X77,STERILE: Brand: MEDLINE

## (undated) DEVICE — 3M™ COBAN™ STERILE SELF-ADHERENT WRAP, 1584S, 4 IN X 5 YD (10 CM X 4,5 M), 18 ROLLS/CASE: Brand: 3M™ COBAN™

## (undated) DEVICE — SUTURE VCRL + SZ 3-0 L18IN ABSRB UD SH 1/2 CIR TAPERCUT NDL VCP864D

## (undated) DEVICE — STERILE LATEX POWDER-FREE SURGICAL GLOVESWITH NITRILE COATING: Brand: PROTEXIS

## (undated) DEVICE — SPLINT ORTH W4XL30IN LAYERED FBRGLS FOAM PD BRTH BK MOLD

## (undated) DEVICE — SUTURE VCRL + SZ 2-0 L18IN ABSRB UD CT1 L36MM 1/2 CIR VCP839D

## (undated) DEVICE — Z CONVERTED USE 2273232 BANDAGE COMPR W6INXL11YD E KNIT DBL SELF CLSR EZE-BAND

## (undated) DEVICE — C-ARM: Brand: UNBRANDED

## (undated) DEVICE — BIT DRL L110MM DIA2.5MM G QUIK CPL W/O STP REUSE